# Patient Record
Sex: FEMALE | Race: WHITE | Employment: FULL TIME | URBAN - METROPOLITAN AREA
[De-identification: names, ages, dates, MRNs, and addresses within clinical notes are randomized per-mention and may not be internally consistent; named-entity substitution may affect disease eponyms.]

---

## 2019-10-17 LAB
BILIRUBIN, URINE: NORMAL
BLOOD, URINE: NEGATIVE
BUN BLDV-MCNC: 13 MG/DL
CALCIUM SERPL-MCNC: NORMAL MG/DL
CHLORIDE BLD-SCNC: NORMAL MMOL/L
CHOLESTEROL, TOTAL: 160 MG/DL
CHOLESTEROL/HDL RATIO: 2.02
CLARITY: NORMAL
CO2: NORMAL
COLOR: NORMAL
CREAT SERPL-MCNC: 0.72 MG/DL
GFR CALCULATED: NORMAL
GLUCOSE BLD-MCNC: 60 MG/DL
GLUCOSE URINE: NEGATIVE
HDLC SERPL-MCNC: 79 MG/DL (ref 35–70)
KETONES, URINE: NORMAL
LDL CHOLESTEROL CALCULATED: 68 MG/DL (ref 0–160)
LEUKOCYTE ESTERASE, URINE: NEGATIVE
NITRITE, URINE: NORMAL
NONHDLC SERPL-MCNC: ABNORMAL MG/DL
PH UA: NORMAL
POTASSIUM SERPL-SCNC: NORMAL MMOL/L
PROTEIN UA: NEGATIVE
SODIUM BLD-SCNC: NORMAL MMOL/L
SPECIFIC GRAVITY, URINE: NORMAL
TRIGL SERPL-MCNC: 63 MG/DL
UROBILINOGEN, URINE: NORMAL
VLDLC SERPL CALC-MCNC: ABNORMAL MG/DL

## 2020-07-22 ENCOUNTER — HOSPITAL ENCOUNTER (OUTPATIENT)
Age: 60
Discharge: HOME | End: 2020-07-24
Payer: COMMERCIAL

## 2020-07-22 ENCOUNTER — OFFICE VISIT (OUTPATIENT)
Dept: FAMILY MEDICINE CLINIC | Age: 60
End: 2020-07-22
Payer: COMMERCIAL

## 2020-07-22 VITALS
HEART RATE: 75 BPM | BODY MASS INDEX: 26.4 KG/M2 | RESPIRATION RATE: 16 BRPM | HEIGHT: 63 IN | WEIGHT: 149 LBS | SYSTOLIC BLOOD PRESSURE: 118 MMHG | OXYGEN SATURATION: 99 % | TEMPERATURE: 97.3 F | DIASTOLIC BLOOD PRESSURE: 72 MMHG

## 2020-07-22 LAB
BILIRUBIN, POC: NORMAL
BLOOD URINE, POC: NORMAL
CLARITY, POC: CLEAR
COLOR, POC: YELLOW
GLUCOSE URINE, POC: NORMAL
KETONES, POC: NORMAL
LEUKOCYTE EST, POC: NORMAL
NITRITE, POC: NORMAL
PH, POC: 6.5
PROTEIN, POC: NORMAL
SPECIFIC GRAVITY, POC: 1.01
UROBILINOGEN, POC: 0.2

## 2020-07-22 PROCEDURE — 87088 URINE BACTERIA CULTURE: CPT

## 2020-07-22 PROCEDURE — 99214 OFFICE O/P EST MOD 30 MIN: CPT | Performed by: PHYSICIAN ASSISTANT

## 2020-07-22 PROCEDURE — 81002 URINALYSIS NONAUTO W/O SCOPE: CPT | Performed by: PHYSICIAN ASSISTANT

## 2020-07-22 PROCEDURE — 87186 SC STD MICRODIL/AGAR DIL: CPT

## 2020-07-22 RX ORDER — CEFDINIR 300 MG/1
300 CAPSULE ORAL 2 TIMES DAILY
Qty: 14 CAPSULE | Refills: 0 | Status: SHIPPED | OUTPATIENT
Start: 2020-07-22 | End: 2020-07-29

## 2020-07-22 ASSESSMENT — ENCOUNTER SYMPTOMS
NAUSEA: 0
SORE THROAT: 0
ABDOMINAL PAIN: 0
SHORTNESS OF BREATH: 0
VOMITING: 0
BACK PAIN: 0
PHOTOPHOBIA: 0
COUGH: 0
DIARRHEA: 0

## 2020-07-22 NOTE — PROGRESS NOTES
20  Michael Woodward : 1960 Sex: female  Age 63 y.o. Subjective:  Chief Complaint   Patient presents with    Dysuria     pt states dysuria for past six days          67-year-old female with negative past medical history presents to the walk-in clinic for evaluation of 5 day history of burning with urination, frequency and urgency. She does state that overnight she did have a small amount of blood in her urine as well. She states that she has been pushing fluids. She states this is her third UTI in the last 6 months. She does not currently have a primary care physician. She states her symptoms feel similar to the last time that she has had a UTI. She denies any abdominal pain or back pain. No fever or chills. No nausea or vomiting. She states normal appetite. Normal bowel movements. Review of Systems   Constitutional: Negative for chills and fever. HENT: Negative for congestion, ear pain and sore throat. Eyes: Negative for photophobia and visual disturbance. Respiratory: Negative for cough and shortness of breath. Cardiovascular: Negative for chest pain. Gastrointestinal: Negative for abdominal pain, diarrhea, nausea and vomiting. Genitourinary: Positive for dysuria, frequency and urgency. Negative for difficulty urinating. Musculoskeletal: Negative for back pain, neck pain and neck stiffness. Skin: Negative for rash. Neurological: Negative for dizziness, syncope, weakness, light-headedness and headaches. Hematological: Negative for adenopathy. Does not bruise/bleed easily. Psychiatric/Behavioral: Negative for agitation and confusion. All other systems reviewed and are negative. PMH:   History reviewed. No pertinent past medical history. History reviewed. No pertinent surgical history. History reviewed. No pertinent family history.     Medications:     Current Outpatient Medications:     Multiple Vitamin (MULTI-VITAMIN PO), Take by mouth, Disp: , Rfl:     Allergies:   No Known Allergies    Social History:     Social History     Tobacco Use    Smoking status: Never Smoker    Smokeless tobacco: Never Used   Substance Use Topics    Alcohol use: Not on file    Drug use: Not on file       Patient lives at home. Physical Exam:     Vitals:    07/22/20 0830   BP: 118/72   Pulse: 75   Resp: 16   Temp: 97.3 °F (36.3 °C)   SpO2: 99%   Weight: 149 lb (67.6 kg)   Height: 5' 3\" (1.6 m)       Exam:  Physical Exam  Vitals signs and nursing note reviewed. Constitutional:       General: She is not in acute distress. Appearance: She is well-developed. HENT:      Head: Normocephalic and atraumatic. Eyes:      Conjunctiva/sclera: Conjunctivae normal.      Pupils: Pupils are equal, round, and reactive to light. Neck:      Musculoskeletal: Normal range of motion. Cardiovascular:      Rate and Rhythm: Normal rate and regular rhythm. Pulmonary:      Effort: Pulmonary effort is normal. No respiratory distress. Breath sounds: Normal breath sounds. Abdominal:      General: Bowel sounds are normal.      Palpations: Abdomen is soft. Tenderness: There is no abdominal tenderness. There is no right CVA tenderness or left CVA tenderness. Musculoskeletal: Normal range of motion. Skin:     General: Skin is warm and dry. Neurological:      Mental Status: She is alert and oriented to person, place, and time. Psychiatric:         Behavior: Behavior normal.         Thought Content: Thought content normal.         Judgment: Judgment normal.           Testing:           Medical Decision Making:     Patient upon arrival did not appear toxic or lethargic. Vital signs were reviewed. Past medical history reviewed. Allergies reviewed. Medications reviewed. Patient is presenting with the above complaint of urinary complaints. Urine dip reveals blood and leukocytes. Urine culture is pending.  Patient was educated on differential diagnosis. Patient will be empirically treated with Omnicef. Patient will be contacted with the results of the urine culture. She is to drink plenty of fluids. Patient was given the new patient information phone number and plans on establishing. We also discussed a referral to urology since this is her third urinary tract infection in the last 6 months. Patient would like to hold off until she is able to establish with a primary care physician. Patient was educated on signs and symptoms that would warrant emergent evaluation in the emergency department. Patient voices understanding and agrees with the plan. Clinical Impression:   Carlie Menon was seen today for dysuria. Diagnoses and all orders for this visit:    Dysuria  -     POCT Urinalysis no Micro  -     Culture, Urine; Future    Acute cystitis with hematuria    Other orders  -     cefdinir (OMNICEF) 300 MG capsule; Take 1 capsule by mouth 2 times daily for 7 days        The patient is to call for any concerns or return if any of the signs or symptoms worsen. The patient is to follow-up with PCP in the next 2-3 days for repeat evaluation repeat assessment or go directly to the emergency department. SIGNATURE: Nayana De PA-C

## 2020-07-24 LAB
ORGANISM: ABNORMAL
URINE CULTURE, ROUTINE: ABNORMAL

## 2020-09-02 ENCOUNTER — OFFICE VISIT (OUTPATIENT)
Dept: PRIMARY CARE CLINIC | Age: 60
End: 2020-09-02
Payer: COMMERCIAL

## 2020-09-02 VITALS
SYSTOLIC BLOOD PRESSURE: 122 MMHG | WEIGHT: 148 LBS | BODY MASS INDEX: 22.43 KG/M2 | TEMPERATURE: 97.3 F | DIASTOLIC BLOOD PRESSURE: 80 MMHG | HEART RATE: 62 BPM | OXYGEN SATURATION: 97 % | HEIGHT: 68 IN | RESPIRATION RATE: 16 BRPM

## 2020-09-02 PROBLEM — H02.846: Status: ACTIVE | Noted: 2020-09-02

## 2020-09-02 PROBLEM — N95.2 VAGINAL ATROPHY: Status: ACTIVE | Noted: 2020-09-02

## 2020-09-02 PROBLEM — N39.0 RECURRENT UTI: Status: ACTIVE | Noted: 2020-09-02

## 2020-09-02 PROBLEM — Z76.89 ESTABLISHING CARE WITH NEW DOCTOR, ENCOUNTER FOR: Status: ACTIVE | Noted: 2020-09-02

## 2020-09-02 PROBLEM — H02.843 SWOLLEN EYELID, RIGHT: Status: ACTIVE | Noted: 2020-09-02

## 2020-09-02 PROCEDURE — 99214 OFFICE O/P EST MOD 30 MIN: CPT | Performed by: FAMILY MEDICINE

## 2020-09-02 PROCEDURE — 90471 IMMUNIZATION ADMIN: CPT | Performed by: FAMILY MEDICINE

## 2020-09-02 PROCEDURE — 90715 TDAP VACCINE 7 YRS/> IM: CPT | Performed by: FAMILY MEDICINE

## 2020-09-02 RX ORDER — CHLORHEXIDINE GLUCONATE 4 %
LIQUID (ML) TOPICAL
COMMUNITY

## 2020-09-02 RX ORDER — ESTRADIOL 4 UG/1
INSERT VAGINAL
COMMUNITY
End: 2021-09-08 | Stop reason: SINTOL

## 2020-09-02 SDOH — HEALTH STABILITY: MENTAL HEALTH: HOW OFTEN DO YOU HAVE A DRINK CONTAINING ALCOHOL?: NEVER

## 2020-09-02 ASSESSMENT — ENCOUNTER SYMPTOMS
EYE REDNESS: 0
ABDOMINAL PAIN: 0
VOMITING: 0
SORE THROAT: 0
SHORTNESS OF BREATH: 0
NAUSEA: 0
RHINORRHEA: 0
PHOTOPHOBIA: 0
COUGH: 0
BACK PAIN: 0
DIARRHEA: 0
BLOOD IN STOOL: 0
WHEEZING: 0
CONSTIPATION: 0

## 2020-09-02 ASSESSMENT — PATIENT HEALTH QUESTIONNAIRE - PHQ9
2. FEELING DOWN, DEPRESSED OR HOPELESS: 0
1. LITTLE INTEREST OR PLEASURE IN DOING THINGS: 0
SUM OF ALL RESPONSES TO PHQ9 QUESTIONS 1 & 2: 0
SUM OF ALL RESPONSES TO PHQ QUESTIONS 1-9: 0
SUM OF ALL RESPONSES TO PHQ QUESTIONS 1-9: 0

## 2020-09-02 NOTE — PROGRESS NOTES
2020    Chief Complaint   Patient presents with   Josephine Velasquez        HPI  Maraina Dao (:  1960) is a 63 y.o. female, here for evaluation of the following medical concerns:    Patient is sensate to establish care consult. Patient recently moved to the area from [place]. Patient also has a additional house in [place] where she also has an [address]. Patient reports that she has a past medical history of vaginal atrophy and recurrent UTIs. Besides this patient reports that she is not having any other medical issues. Patient does not take any specific medications besides estradiol for her vaginal atrophy. This is prescribed by her [address]. Patient has not had lab work in approximately one year. Patient reports that she is up-to-date on all of her gynecological care. Patient is due for an updated tetanus booster, shingles immunization, and flu immunization. Patient reports she did have a colonoscopy approximately 7-8 years ago. Recurrent UTIs: Reports of this is becoming issue over the last few months. Patient has had approximate 78 UTIs. She has had several rounds of antibiotics. Patient's most recent culture did not show any signs of resistance. Patient was placed on estradiol by her [address] physician to hopefully decrease her frequency of UTIs. Patient is a  of a healthcare company. Review of Systems   Constitutional: Negative for chills and fever. HENT: Negative for congestion, hearing loss, postnasal drip, rhinorrhea, sneezing, sore throat and tinnitus. Eyes: Negative for photophobia and redness. Respiratory: Negative for cough, shortness of breath and wheezing. Cardiovascular: Negative for chest pain, palpitations and leg swelling. Gastrointestinal: Negative for abdominal pain, blood in stool, constipation, diarrhea, nausea and vomiting. Endocrine: Negative for polydipsia and polyuria.    Genitourinary: Negative for difficulty urinating, dysuria, hematuria, vaginal bleeding and vaginal discharge. Musculoskeletal: Negative for arthralgias and back pain. Skin: Negative for rash. Neurological: Negative for dizziness, weakness, light-headedness, numbness and headaches. Psychiatric/Behavioral: The patient is not nervous/anxious. Prior to Visit Medications    Medication Sig Taking? Authorizing Provider   Estradiol (IMVEXXY MAINTENANCE PACK) 4 MCG INST Place vaginally Twice a Week Yes Historical Provider, MD   Calcium Carb-Cholecalciferol (CALCIUM+D3) 600-800 MG-UNIT TABS Take by mouth Yes Historical Provider, MD   Multiple Vitamin (MULTI-VITAMIN PO) Take by mouth  Historical Provider, MD        No Known Allergies    History reviewed. No pertinent past medical history. Menstrual History:  OB History        2    Para   2    Term                AB        Living           SAB        TAB        Ectopic        Molar        Multiple        Live Births                     History reviewed. No pertinent surgical history. History reviewed. No pertinent family history.     Immunization History   Administered Date(s) Administered    Tdap (Boostrix, Adacel) 2020       Health Maintenance   Topic Date Due    Cervical cancer screen  1980    Lipid screen  1999    Breast cancer screen  2009    Shingles Vaccine (1 of 2) 2009    Colon cancer screen colonoscopy  2009    Flu vaccine (1) 2020    DTaP/Tdap/Td vaccine (2 - Td) 2030    Hepatitis A vaccine  Aged Out    Hepatitis B vaccine  Aged Out    Hib vaccine  Aged Out    Meningococcal (ACWY) vaccine  Aged Out    Pneumococcal 0-64 years Vaccine  Aged Out    Hepatitis C screen  Discontinued    HIV screen  Discontinued       Social History     Socioeconomic History    Marital status:      Spouse name: Not on file    Number of children: Not on file    Years of education: Not on file    Highest education level: Not on normal.      Pupils: Pupils are equal, round, and reactive to light. Neck:      Musculoskeletal: Neck supple. Thyroid: No thyromegaly. Trachea: Trachea normal.   Cardiovascular:      Rate and Rhythm: Normal rate and regular rhythm. Pulses:           Radial pulses are 2+ on the right side and 2+ on the left side. Posterior tibial pulses are 2+ on the right side and 2+ on the left side. Heart sounds: Normal heart sounds. No murmur. Pulmonary:      Effort: Pulmonary effort is normal. No respiratory distress. Breath sounds: Normal breath sounds. No decreased breath sounds, wheezing or rales. Abdominal:      General: Bowel sounds are normal. There is no distension. Palpations: Abdomen is soft. Tenderness: There is no abdominal tenderness. There is no rebound. Musculoskeletal: Normal range of motion. Right lower leg: No edema. Left lower leg: No edema. Skin:     General: Skin is warm and dry. Findings: No rash. Neurological:      Mental Status: She is alert and oriented to person, place, and time. Cranial Nerves: No cranial nerve deficit. Sensory: No sensory deficit. Deep Tendon Reflexes:      Reflex Scores:       Patellar reflexes are 2+ on the right side and 2+ on the left side. Psychiatric:         Behavior: Behavior normal.         Patient Active Problem List    Diagnosis Date Noted    Establishing care with new doctor, encounter for 09/02/2020    Swollen eyelid, left 09/02/2020    Recurrent UTI 09/02/2020    Vaginal atrophy 09/02/2020    Swollen eyelid, right 09/02/2020         ASSESSMENT/PLAN:  Raegan Mcclain was seen today for established new doctor. Diagnoses and all orders for this visit:    Recurrent UTI  -     CBC Auto Differential; Future  -     Comprehensive Metabolic Panel; Future  Patient is currently on estradiol for such. Consider adding d-mannose in the near future if she continues to have recurrent UTIs.  Possibly advised patient to avoid chicken. Establishing care with new doctor, encounter for  -     CBC Auto Differential; Future  -     Comprehensive Metabolic Panel; Future  -     Lipid Panel; Future  -     TSH without Reflex; Future  Will need to obtain the patient's previous medical records. Patient does not have any significant risk factors and denies any history of tobacco, alcohol, illicit drug use. Patient consumes a healthy and well rounded diet and exercises regularly. Vaginal atrophy  -     CBC Auto Differential; Future  -     Comprehensive Metabolic Panel; Future  Following with ObGyn for this issue and is on estradiol twice weekly, vaginal cream.    Screening cholesterol level  -     Lipid Panel; Future    Need for tetanus booster  -     Tdap (age 6y and older) IM ([address])    Need for diphtheria-tetanus-pertussis (Tdap) vaccine  -     Tdap (age 6y and older) IM (BOOSTRIX)    Swelling of eyelids: Patient reports that this started several months ago after she had a severe allergic reaction to an unknown substance. She did follow with dermatology. She was advised that she may need to see an ophthalmologist approximately 8 months. Patient reports 2-3 times a month swelling of her eyelids which resulted in droopiness and blurry vision. If the patient holds her eyelids up her vision improves. In regards this issue the patient should keep a food diary for review to determine if there are any specific triggers for her reported swelling of her eyelids. Health Maintenance reviewed - patient is to have all her previous medical records forwarded to myself. Patient is also to have her records from her [facility] forwarded to myself. Patient will obtain a flu shot and shingles immunization at her local pharmacy. Return in about 4 weeks (around 9/30/2020) for Follow-up Appointment From Today's Visit, To Discuss Labs Results.       Educational materials and/or home exercises printed for patient's review and were included in patient instructions on his/her After Visit Summary and given to patient at the end of visit.       Counseled regarding above diagnosis, including possible risks and complications,  especially if left uncontrolled.     Counseled regarding the possible side effects, risks, benefits and alternatives to treatment; patient and/or guardianverbalizes understanding, agrees, feels comfortable with and wishes to proceed with above treatment plan.     Advised patient to call with any new medication issues, and read all Rx info from pharmacy to assure aware of all possible risks and side effects of medication before taking.     Reviewed age and gender appropriate health screening exams and vaccinations. Advised patient regarding importance of keeping up withrecommended health maintenance and to schedule as soon as possible if overdue, as this is important in assessing for undiagnosed pathology, especially cancer, as well as protecting against potentially harmful/lifethreatening disease.       Patient and/or guardian verbalizes understanding and agrees with abovecounseling, assessment and plan.     All questions answered. An  electronic signature was used to authenticatethis note.     --Dimple Fortune MD on 9/2/20 at 8:14 AM EDT

## 2020-09-23 ENCOUNTER — HOSPITAL ENCOUNTER (OUTPATIENT)
Age: 60
Discharge: HOME | End: 2020-09-25
Payer: COMMERCIAL

## 2020-09-23 LAB
ALBUMIN SERPL-MCNC: 4.2 G/DL (ref 3.5–5.2)
ALP BLD-CCNC: 70 U/L (ref 35–104)
ALT SERPL-CCNC: 12 U/L (ref 0–32)
ANION GAP SERPL CALCULATED.3IONS-SCNC: 13 MMOL/L (ref 7–16)
AST SERPL-CCNC: 19 U/L (ref 0–31)
BASOPHILS ABSOLUTE: 0.07 E9/L (ref 0–0.2)
BASOPHILS RELATIVE PERCENT: 1.4 % (ref 0–2)
BILIRUB SERPL-MCNC: 0.2 MG/DL (ref 0–1.2)
BUN BLDV-MCNC: 18 MG/DL (ref 8–23)
CALCIUM SERPL-MCNC: 10 MG/DL (ref 8.6–10.2)
CHLORIDE BLD-SCNC: 103 MMOL/L (ref 98–107)
CHOLESTEROL, TOTAL: 200 MG/DL (ref 0–199)
CO2: 26 MMOL/L (ref 22–29)
CREAT SERPL-MCNC: 0.8 MG/DL (ref 0.5–1)
EOSINOPHILS ABSOLUTE: 0.33 E9/L (ref 0.05–0.5)
EOSINOPHILS RELATIVE PERCENT: 6.4 % (ref 0–6)
GFR AFRICAN AMERICAN: >60
GFR NON-AFRICAN AMERICAN: >60 ML/MIN/1.73
GLUCOSE BLD-MCNC: 83 MG/DL (ref 74–99)
HCT VFR BLD CALC: 42.6 % (ref 34–48)
HDLC SERPL-MCNC: 75 MG/DL
HEMOGLOBIN: 13.6 G/DL (ref 11.5–15.5)
IMMATURE GRANULOCYTES #: 0.01 E9/L
IMMATURE GRANULOCYTES %: 0.2 % (ref 0–5)
LDL CHOLESTEROL CALCULATED: 115 MG/DL (ref 0–99)
LYMPHOCYTES ABSOLUTE: 2.34 E9/L (ref 1.5–4)
LYMPHOCYTES RELATIVE PERCENT: 45.3 % (ref 20–42)
MCH RBC QN AUTO: 30.6 PG (ref 26–35)
MCHC RBC AUTO-ENTMCNC: 31.9 % (ref 32–34.5)
MCV RBC AUTO: 95.7 FL (ref 80–99.9)
MONOCYTES ABSOLUTE: 0.35 E9/L (ref 0.1–0.95)
MONOCYTES RELATIVE PERCENT: 6.8 % (ref 2–12)
NEUTROPHILS ABSOLUTE: 2.07 E9/L (ref 1.8–7.3)
NEUTROPHILS RELATIVE PERCENT: 39.9 % (ref 43–80)
PDW BLD-RTO: 12.9 FL (ref 11.5–15)
PLATELET # BLD: 302 E9/L (ref 130–450)
PMV BLD AUTO: 11 FL (ref 7–12)
POTASSIUM SERPL-SCNC: 4.8 MMOL/L (ref 3.5–5)
RBC # BLD: 4.45 E12/L (ref 3.5–5.5)
SODIUM BLD-SCNC: 142 MMOL/L (ref 132–146)
TOTAL PROTEIN: 6.8 G/DL (ref 6.4–8.3)
TRIGL SERPL-MCNC: 52 MG/DL (ref 0–149)
TSH SERPL DL<=0.05 MIU/L-ACNC: 2 UIU/ML (ref 0.27–4.2)
VLDLC SERPL CALC-MCNC: 10 MG/DL
WBC # BLD: 5.2 E9/L (ref 4.5–11.5)

## 2020-09-23 PROCEDURE — 84443 ASSAY THYROID STIM HORMONE: CPT

## 2020-09-23 PROCEDURE — 80053 COMPREHEN METABOLIC PANEL: CPT

## 2020-09-23 PROCEDURE — 36415 COLL VENOUS BLD VENIPUNCTURE: CPT

## 2020-09-23 PROCEDURE — 85025 COMPLETE CBC W/AUTO DIFF WBC: CPT

## 2020-09-23 PROCEDURE — 80061 LIPID PANEL: CPT

## 2020-10-14 ENCOUNTER — OFFICE VISIT (OUTPATIENT)
Dept: PRIMARY CARE CLINIC | Age: 60
End: 2020-10-14
Payer: COMMERCIAL

## 2020-10-14 VITALS
HEIGHT: 68 IN | SYSTOLIC BLOOD PRESSURE: 122 MMHG | HEART RATE: 74 BPM | TEMPERATURE: 97.9 F | OXYGEN SATURATION: 97 % | WEIGHT: 145 LBS | RESPIRATION RATE: 16 BRPM | DIASTOLIC BLOOD PRESSURE: 82 MMHG | BODY MASS INDEX: 21.98 KG/M2

## 2020-10-14 PROBLEM — Z76.89 ESTABLISHING CARE WITH NEW DOCTOR, ENCOUNTER FOR: Status: RESOLVED | Noted: 2020-09-02 | Resolved: 2020-10-14

## 2020-10-14 PROCEDURE — 99214 OFFICE O/P EST MOD 30 MIN: CPT | Performed by: FAMILY MEDICINE

## 2020-10-14 RX ORDER — MULTIVIT-MIN/IRON/FOLIC ACID/K 18-600-40
CAPSULE ORAL
COMMUNITY

## 2020-10-14 ASSESSMENT — ENCOUNTER SYMPTOMS
VOMITING: 0
RHINORRHEA: 0
WHEEZING: 0
ABDOMINAL PAIN: 0
SHORTNESS OF BREATH: 0
SORE THROAT: 0
DIARRHEA: 0
CONSTIPATION: 0
NAUSEA: 0

## 2020-10-14 NOTE — PROGRESS NOTES
10/14/2020     Chief Complaint   Patient presents with    Results       HPI  Judythe Favre (:  1960) is a 63 y.o. female, here for evaluation of the following medical concerns:    Labs: CBC within normal limits, CMP within normal limits, total cholesterol 200, total size 52, HDL 75, . TSH 2, urinalysis essentially negative, urine culture positive for E. coli greater than 100,000 CFU. History of allergic reaction: Patient and her last office appointment was advised to keep a food diary regards this issue. Patient reports that while she did not keep a food diary she was keeping a mental log of the food she was consuming. The only food that seem to trigger the swelling of her eyelids and droopiness was a pizza. Patient reports that she usually does not consume refined grains or gluten-containing foods. This may have been her trigger. Patient has not followed up with ophthalmology in regards to this issue. Dermatology did not find any specific etiology for her reported symptoms. Patient did not try anything in regards to medications for this issue. DEXA scan: Patient reports osteopenia in her hip. Patient did start 2000 international units of vitamin D daily. Vaginal atrophy: Patient is following with ObGyn for this issue. Using vaginal cream twice weekly. Stable. History of recurrent UTIs: Regards this issue patient has been seeing ObGyn and was placed on estradiol topically for such. No recurrent UTIs since her last office appointment. Health maintenance: Patient does follow with OB/GYN. Up-to-date on all of her other issues. Patient did not get her flu shot. Review of Systems   Constitutional: Negative for chills and fever. HENT: Negative for congestion, rhinorrhea and sore throat. Respiratory: Negative for shortness of breath and wheezing. Cardiovascular: Negative for chest pain and leg swelling.    Gastrointestinal: Negative for abdominal pain, constipation,

## 2020-10-22 ENCOUNTER — HOSPITAL ENCOUNTER (OUTPATIENT)
Age: 60
Discharge: HOME | End: 2020-10-24
Payer: COMMERCIAL

## 2020-10-22 ENCOUNTER — TELEPHONE (OUTPATIENT)
Dept: PRIMARY CARE CLINIC | Age: 60
End: 2020-10-22

## 2020-10-22 LAB — VITAMIN D 25-HYDROXY: 36 NG/ML (ref 30–100)

## 2020-10-22 PROCEDURE — 82306 VITAMIN D 25 HYDROXY: CPT

## 2020-10-22 PROCEDURE — 36415 COLL VENOUS BLD VENIPUNCTURE: CPT

## 2020-12-28 ENCOUNTER — OFFICE VISIT (OUTPATIENT)
Dept: PRIMARY CARE CLINIC | Age: 60
End: 2020-12-28
Payer: COMMERCIAL

## 2020-12-28 VITALS
SYSTOLIC BLOOD PRESSURE: 114 MMHG | HEIGHT: 68 IN | WEIGHT: 131 LBS | RESPIRATION RATE: 18 BRPM | HEART RATE: 76 BPM | BODY MASS INDEX: 19.85 KG/M2 | OXYGEN SATURATION: 97 % | DIASTOLIC BLOOD PRESSURE: 80 MMHG | TEMPERATURE: 97.1 F

## 2020-12-28 DIAGNOSIS — H02.846 SWOLLEN EYELID, LEFT: ICD-10-CM

## 2020-12-28 DIAGNOSIS — H02.843 SWOLLEN EYELID, RIGHT: ICD-10-CM

## 2020-12-28 DIAGNOSIS — R53.83 FATIGUE, UNSPECIFIED TYPE: ICD-10-CM

## 2020-12-28 DIAGNOSIS — M79.89 SWELLING OF LOWER EXTREMITY: ICD-10-CM

## 2020-12-28 LAB
ALBUMIN SERPL-MCNC: 5.1 G/DL (ref 3.5–5.2)
ALP BLD-CCNC: 55 U/L (ref 35–104)
ALT SERPL-CCNC: 13 U/L (ref 0–32)
ANION GAP SERPL CALCULATED.3IONS-SCNC: 18 MMOL/L (ref 7–16)
AST SERPL-CCNC: 17 U/L (ref 0–31)
BASOPHILS ABSOLUTE: 0.05 E9/L (ref 0–0.2)
BASOPHILS RELATIVE PERCENT: 0.8 % (ref 0–2)
BILIRUB SERPL-MCNC: 0.3 MG/DL (ref 0–1.2)
BUN BLDV-MCNC: 18 MG/DL (ref 8–23)
C-REACTIVE PROTEIN: <0.1 MG/DL (ref 0–0.4)
CALCIUM SERPL-MCNC: 10.5 MG/DL (ref 8.6–10.2)
CHLORIDE BLD-SCNC: 104 MMOL/L (ref 98–107)
CO2: 22 MMOL/L (ref 22–29)
CREAT SERPL-MCNC: 0.7 MG/DL (ref 0.5–1)
EOSINOPHILS ABSOLUTE: 0.24 E9/L (ref 0.05–0.5)
EOSINOPHILS RELATIVE PERCENT: 3.9 % (ref 0–6)
GFR AFRICAN AMERICAN: >60
GFR NON-AFRICAN AMERICAN: >60 ML/MIN/1.73
GLUCOSE BLD-MCNC: 94 MG/DL (ref 74–99)
HCT VFR BLD CALC: 44.7 % (ref 34–48)
HEMOGLOBIN: 14.5 G/DL (ref 11.5–15.5)
IMMATURE GRANULOCYTES #: 0.01 E9/L
IMMATURE GRANULOCYTES %: 0.2 % (ref 0–5)
LYMPHOCYTES ABSOLUTE: 2.57 E9/L (ref 1.5–4)
LYMPHOCYTES RELATIVE PERCENT: 41.9 % (ref 20–42)
MCH RBC QN AUTO: 30.6 PG (ref 26–35)
MCHC RBC AUTO-ENTMCNC: 32.4 % (ref 32–34.5)
MCV RBC AUTO: 94.3 FL (ref 80–99.9)
MONOCYTES ABSOLUTE: 0.38 E9/L (ref 0.1–0.95)
MONOCYTES RELATIVE PERCENT: 6.2 % (ref 2–12)
NEUTROPHILS ABSOLUTE: 2.89 E9/L (ref 1.8–7.3)
NEUTROPHILS RELATIVE PERCENT: 47 % (ref 43–80)
PDW BLD-RTO: 14.1 FL (ref 11.5–15)
PLATELET # BLD: 306 E9/L (ref 130–450)
PMV BLD AUTO: 11.3 FL (ref 7–12)
POTASSIUM SERPL-SCNC: 4.2 MMOL/L (ref 3.5–5)
PRO-BNP: 71 PG/ML (ref 0–125)
RBC # BLD: 4.74 E12/L (ref 3.5–5.5)
RHEUMATOID FACTOR: <10 IU/ML (ref 0–13)
SEDIMENTATION RATE, ERYTHROCYTE: 6 MM/HR (ref 0–20)
SODIUM BLD-SCNC: 144 MMOL/L (ref 132–146)
T4 FREE: 0.98 NG/DL (ref 0.93–1.7)
TOTAL PROTEIN: 7.9 G/DL (ref 6.4–8.3)
TSH SERPL DL<=0.05 MIU/L-ACNC: 2.65 UIU/ML (ref 0.27–4.2)
WBC # BLD: 6.1 E9/L (ref 4.5–11.5)

## 2020-12-28 PROCEDURE — 99213 OFFICE O/P EST LOW 20 MIN: CPT | Performed by: FAMILY MEDICINE

## 2020-12-28 RX ORDER — METHYLPREDNISOLONE 4 MG/1
TABLET ORAL
Qty: 1 KIT | Refills: 0 | Status: SHIPPED
Start: 2020-12-28 | End: 2021-01-26 | Stop reason: ALTCHOICE

## 2020-12-28 ASSESSMENT — ENCOUNTER SYMPTOMS
COUGH: 0
VOMITING: 0
SHORTNESS OF BREATH: 0
DIARRHEA: 0
RHINORRHEA: 0
ABDOMINAL PAIN: 0
NAUSEA: 0
WHEEZING: 0
SORE THROAT: 0
CONSTIPATION: 0

## 2020-12-28 NOTE — PROGRESS NOTES
2020     Chief Complaint   Patient presents with    Facial Swelling     since     Fatigue     last 2 weeks. ESTEFANIA Maravilla (:  1960) is a 63 y.o. female, here for evaluation of the following medical concerns:     Patient reports that she has had ongoing fatigue and tiredness with up to sleeping 12 hours/day over the last several weeks. This has not changed significantly. Patient also developed lower extremity swelling and facial swelling around her eyelids approximately 4 to 6 days ago. Patient reports that her lower extremity swelling resolved within 24 to 48 hours. However she has had persistent facial swelling around her eyelids. This was addressed at the patient's previous office appointment. She did see dermatology without any specific diagnosis or resolution. Patient denies any muscle aches or pains. Patient denies any weakness. Patient denies any specific rashes. Patient denies eye pain or sensitivity. Review of Systems   Constitutional: Negative for chills and fever. HENT: Negative for congestion, rhinorrhea and sore throat. Respiratory: Negative for cough, shortness of breath and wheezing. Cardiovascular: Positive for leg swelling. Negative for chest pain. Gastrointestinal: Negative for abdominal pain, constipation, diarrhea, nausea and vomiting. Skin: Negative for rash. Neurological: Negative for light-headedness and headaches. No past medical history on file. Prior to Visit Medications    Medication Sig Taking? Authorizing Provider   methylPREDNISolone (MEDROL, ALLEN,) 4 MG tablet Take by mouth.  Yes German Bradley MD   Cholecalciferol (VITAMIN D) 50 MCG ( UT) CAPS capsule Take by mouth Yes Historical Provider, MD   Estradiol (IMVEXXY MAINTENANCE PACK) 4 MCG INST Place vaginally Twice a Week Yes Historical Provider, MD   Calcium Carb-Cholecalciferol (CALCIUM+D3) 600-800 MG-UNIT TABS Take by mouth Yes Historical Provider, MD NATRIURETIC PEPTIDE; Future  -     methylPREDNISolone (MEDROL, ALLEN,) 4 MG tablet; Take by mouth. Swollen eyelid, right  -     CBC Auto Differential; Future  -     Comprehensive Metabolic Panel; Future  -     TSH without Reflex; Future  -     T4, Free; Future  -     C-Reactive Protein; Future  -     Sedimentation Rate; Future  -     KARI; Future  -     Rheumatoid Factor; Future  -     External Referral To Ophthalmology  -     BRAIN NATRIURETIC PEPTIDE; Future  -     methylPREDNISolone (MEDROL, ALLEN,) 4 MG tablet; Take by mouth. Fatigue, unspecified type  -     CBC Auto Differential; Future  -     Comprehensive Metabolic Panel; Future  -     TSH without Reflex; Future  -     T4, Free; Future  -     C-Reactive Protein; Future  -     Sedimentation Rate; Future  -     KARI; Future  -     Rheumatoid Factor; Future    Swelling of lower extremity  -     CBC Auto Differential; Future  -     Comprehensive Metabolic Panel; Future  -     TSH without Reflex; Future  -     T4, Free; Future  -     C-Reactive Protein; Future  -     Sedimentation Rate; Future  -     KARI; Future  -     Rheumatoid Factor; Future  -     BRAIN NATRIURETIC PEPTIDE; Future    Complete labs today. Rule out autoimmune issue. Patient to follow-up with ophthalmology per referral.  Trial of steroids. Side effects from steroids were reviewed. Will contact patient with lab results and inquire into how she is doing. Patient is to proceed to the emergency department with any shortness of breath or chest pain. Unlikely cardiac or pulmonary etiology due to lack of other systemic symptoms. Return if symptoms worsen or fail to improve. An electronicsignature was used to authenticate this note.     --Shant Hines MD on 12/28/20 at 11:34 AM EST

## 2020-12-29 LAB — ANTI-NUCLEAR ANTIBODY (ANA): NEGATIVE

## 2021-01-26 ENCOUNTER — OFFICE VISIT (OUTPATIENT)
Dept: FAMILY MEDICINE CLINIC | Age: 61
End: 2021-01-26
Payer: COMMERCIAL

## 2021-01-26 ENCOUNTER — TELEPHONE (OUTPATIENT)
Dept: PRIMARY CARE CLINIC | Age: 61
End: 2021-01-26

## 2021-01-26 VITALS
HEIGHT: 68 IN | HEART RATE: 84 BPM | OXYGEN SATURATION: 98 % | RESPIRATION RATE: 18 BRPM | DIASTOLIC BLOOD PRESSURE: 72 MMHG | TEMPERATURE: 97.7 F | WEIGHT: 136 LBS | SYSTOLIC BLOOD PRESSURE: 116 MMHG | BODY MASS INDEX: 20.61 KG/M2

## 2021-01-26 DIAGNOSIS — N39.0 URINARY TRACT INFECTION WITH HEMATURIA, SITE UNSPECIFIED: Primary | ICD-10-CM

## 2021-01-26 DIAGNOSIS — R30.0 DYSURIA: ICD-10-CM

## 2021-01-26 DIAGNOSIS — R31.9 HEMATURIA, UNSPECIFIED TYPE: ICD-10-CM

## 2021-01-26 DIAGNOSIS — R31.9 URINARY TRACT INFECTION WITH HEMATURIA, SITE UNSPECIFIED: Primary | ICD-10-CM

## 2021-01-26 DIAGNOSIS — R35.0 URINARY FREQUENCY: ICD-10-CM

## 2021-01-26 LAB
BILIRUBIN, POC: NEGATIVE
BLOOD URINE, POC: NORMAL
CLARITY, POC: CLEAR
COLOR, POC: YELLOW
GLUCOSE URINE, POC: NEGATIVE
KETONES, POC: NEGATIVE
LEUKOCYTE EST, POC: NORMAL
NITRITE, POC: NEGATIVE
PH, POC: 6.5
PROTEIN, POC: NEGATIVE
SPECIFIC GRAVITY, POC: 1.01
UROBILINOGEN, POC: 0.2

## 2021-01-26 PROCEDURE — 99214 OFFICE O/P EST MOD 30 MIN: CPT | Performed by: PHYSICIAN ASSISTANT

## 2021-01-26 PROCEDURE — 81002 URINALYSIS NONAUTO W/O SCOPE: CPT | Performed by: PHYSICIAN ASSISTANT

## 2021-01-26 RX ORDER — PHENAZOPYRIDINE HYDROCHLORIDE 200 MG/1
200 TABLET, FILM COATED ORAL 3 TIMES DAILY PRN
Qty: 9 TABLET | Refills: 0 | Status: SHIPPED | OUTPATIENT
Start: 2021-01-26 | End: 2021-01-29

## 2021-01-26 RX ORDER — NITROFURANTOIN 25; 75 MG/1; MG/1
100 CAPSULE ORAL 2 TIMES DAILY
Qty: 14 CAPSULE | Refills: 0 | Status: SHIPPED
Start: 2021-01-26 | End: 2021-03-17 | Stop reason: SDUPTHER

## 2021-01-26 NOTE — PROGRESS NOTES
21  Wanda Lawrence : 1960 Sex: female  Age 63 y.o. Subjective:  Chief Complaint   Patient presents with    Dysuria     dysuria for past several days          HPI:   Wanda Lawrence , 63 y.o. female presents to [facility] care for evaluation of dysuria and frequency. The patient has noted the symptoms ongoing for the last 3 to 4 days. Seems to be worse today. The patient's not any chest pain, shortness of breath, lightheadedness, dizziness. The patient tolerated back pain, flank pain. The patient is eating and drinking normally. Not currently on any antibiotics. Patient does have a history of recurrent urinary tract infection. She recently was started on the Imvexxy which was helping but now she has presented again. Her last UTI in epic was in 2020. The patient is not having any fevers or chills. No vaginal bleeding or vaginal discharge. ROS:   Unless otherwise stated in this report the patient's positive and negative responses for review of systems for constitutional, eyes, ENT, cardiovascular, respiratory, gastrointestinal, neurological, , musculoskeletal, and integument systems and related systems to the presenting problem are either stated in the history of present illness or were not pertinent or were negative for the symptoms and/or complaints related to the presenting medical problem. Positives and pertinent negatives as per HPI. All others reviewed and are negative. PMH:   No past medical history on file. No past surgical history on file. No family history on file.     Medications:     Current Outpatient Medications:     phenazopyridine (PYRIDIUM) 200 MG tablet, Take 1 tablet by mouth 3 times daily as needed for Pain, Disp: 9 tablet, Rfl: 0    nitrofurantoin, macrocrystal-monohydrate, (MACROBID) 100 MG capsule, Take 1 capsule by mouth 2 times daily for 7 days, Disp: 14 capsule, Rfl: 0    Cholecalciferol (VITAMIN D) 50 MCG (2000) CAPS capsule, Take by hematuria, site unspecified    Dysuria  -     POCT Urinalysis no Micro  -     Culture, Urine; Future    Urinary frequency    Hematuria, unspecified type    Other orders  -     phenazopyridine (PYRIDIUM) 200 MG tablet; Take 1 tablet by mouth 3 times daily as needed for Pain  -     nitrofurantoin, macrocrystal-monohydrate, (MACROBID) 100 MG capsule; Take 1 capsule by mouth 2 times daily for 7 days        The patient is to call for any concerns or return if any of the signs or symptoms worsen. The patient is to follow-up with PCP in the next 2-3 days for repeat evaluation repeat assessment or go directly to the emergency department.      SIGNATURE: Daniel Vallecillo III, PA-C

## 2021-01-26 NOTE — TELEPHONE ENCOUNTER
Patient calling, has uti, burning, pain, and frequency. Asking if you could call in antibiotic for her. Kiarra Every Dr. Olman Sepulveda patient you were out of office and advised express care if she did not hear back from office by this afternoon.

## 2021-01-29 LAB
ORGANISM: ABNORMAL
URINE CULTURE, ROUTINE: ABNORMAL

## 2021-03-17 ENCOUNTER — OFFICE VISIT (OUTPATIENT)
Dept: PRIMARY CARE CLINIC | Age: 61
End: 2021-03-17
Payer: COMMERCIAL

## 2021-03-17 VITALS
SYSTOLIC BLOOD PRESSURE: 106 MMHG | HEART RATE: 59 BPM | BODY MASS INDEX: 20.61 KG/M2 | OXYGEN SATURATION: 100 % | RESPIRATION RATE: 18 BRPM | WEIGHT: 136 LBS | TEMPERATURE: 97.3 F | HEIGHT: 68 IN | DIASTOLIC BLOOD PRESSURE: 78 MMHG

## 2021-03-17 DIAGNOSIS — N30.01 ACUTE CYSTITIS WITH HEMATURIA: ICD-10-CM

## 2021-03-17 DIAGNOSIS — R35.0 URINARY FREQUENCY: ICD-10-CM

## 2021-03-17 DIAGNOSIS — R35.0 URINARY FREQUENCY: Primary | ICD-10-CM

## 2021-03-17 LAB
BILIRUBIN, POC: NEGATIVE
BLOOD URINE, POC: NORMAL
CLARITY, POC: NORMAL
COLOR, POC: YELLOW
GLUCOSE URINE, POC: NEGATIVE
KETONES, POC: NEGATIVE
LEUKOCYTE EST, POC: NORMAL
NITRITE, POC: NEGATIVE
PH, POC: 6
PROTEIN, POC: NEGATIVE
SPECIFIC GRAVITY, POC: 1.01
UROBILINOGEN, POC: 0.2

## 2021-03-17 PROCEDURE — 81003 URINALYSIS AUTO W/O SCOPE: CPT | Performed by: FAMILY MEDICINE

## 2021-03-17 PROCEDURE — 99213 OFFICE O/P EST LOW 20 MIN: CPT | Performed by: FAMILY MEDICINE

## 2021-03-17 RX ORDER — NITROFURANTOIN 25; 75 MG/1; MG/1
100 CAPSULE ORAL 2 TIMES DAILY
Qty: 14 CAPSULE | Refills: 0 | Status: SHIPPED | OUTPATIENT
Start: 2021-03-17 | End: 2021-03-24

## 2021-03-17 ASSESSMENT — PATIENT HEALTH QUESTIONNAIRE - PHQ9
SUM OF ALL RESPONSES TO PHQ QUESTIONS 1-9: 0

## 2021-03-17 NOTE — PROGRESS NOTES
3/17/21  Nayana Cano : 1960 Sex: female  Age: 63 y.o. Chief Complaint   Patient presents with    Urinary Tract Infection     frequency, burning, uncomfortable started monday       HPI: The patient states that they have had dysuria and urinary frequency for the last 3 days. The patient does admit to suprapubic pressure. The patient has had urgency but denies gross hematuria. The patient denies any abdominal or flank pain. The patient denies nausea and vomiting. No fevers or chills. No prior history of kidney stones. The patient has a history of UTI's and states that this feels the same. They come to the urgent care for evaluation. ROS:  Const: Positives and pertinent negatives as per HPI. All others reviewed and are negative. Current Outpatient Medications:     nitrofurantoin, macrocrystal-monohydrate, (MACROBID) 100 MG capsule, Take 1 capsule by mouth 2 times daily for 7 days, Disp: 14 capsule, Rfl: 0    Cholecalciferol (VITAMIN D) 50 MCG (2000 UT) CAPS capsule, Take by mouth, Disp: , Rfl:     Estradiol (IMVEXXY MAINTENANCE PACK) 4 MCG INST, Place vaginally Twice a Week, Disp: , Rfl:     Calcium Carb-Cholecalciferol (CALCIUM+D3) 600-800 MG-UNIT TABS, Take by mouth, Disp: , Rfl:     Multiple Vitamin (MULTI-VITAMIN PO), Take by mouth, Disp: , Rfl:   No Known Allergies    No past medical history on file. No past surgical history on file. No family history on file.   Social History     Socioeconomic History    Marital status:      Spouse name: Not on file    Number of children: Not on file    Years of education: Not on file    Highest education level: Not on file   Occupational History    Not on file   Social Needs    Financial resource strain: Not on file    Food insecurity     Worry: Not on file     Inability: Not on file    Transportation needs     Medical: Not on file     Non-medical: Not on file   Tobacco Use    Smoking status: Never Smoker    Smokeless tobacco: Never Used   Substance and Sexual Activity    Alcohol use: Never     Frequency: Never    Drug use: Never    Sexual activity: Not on file   Lifestyle    Physical activity     Days per week: Not on file     Minutes per session: Not on file    Stress: Not on file   Relationships    Social connections     Talks on phone: Not on file     Gets together: Not on file     Attends Roman Catholic service: Not on file     Active member of club or organization: Not on file     Attends meetings of clubs or organizations: Not on file     Relationship status: Not on file    Intimate partner violence     Fear of current or ex partner: Not on file     Emotionally abused: Not on file     Physically abused: Not on file     Forced sexual activity: Not on file   Other Topics Concern    Not on file   Social History Narrative    Not on file       Vitals:    03/17/21 1002   BP: 106/78   Pulse: 59   Resp: 18   Temp: 97.3 °F (36.3 °C)   TempSrc: Temporal   SpO2: 100%   Weight: 136 lb (61.7 kg)   Height: 5' 8\" (1.727 m)       Exam:  Physical Exam  Constitutional:       Appearance: She is well-developed. HENT:      Head: Normocephalic. Eyes:      Conjunctiva/sclera: Conjunctivae normal.      Pupils: Pupils are equal, round, and reactive to light. Cardiovascular:      Rate and Rhythm: Normal rate and regular rhythm. Heart sounds: Normal heart sounds. No murmur. Pulmonary:      Effort: Pulmonary effort is normal.      Breath sounds: Normal breath sounds. No wheezing or rales. Abdominal:      General: Bowel sounds are normal.      Palpations: Abdomen is soft. Tenderness: There is no abdominal tenderness. Neurological:      Mental Status: She is alert. Comments: Cranial nerves grossly intact         Assessment and Plan:   Jeane was seen today for urinary tract infection.     Diagnoses and all orders for this visit:    Urinary frequency  -     POCT Urinalysis No Micro (Auto)  -     nitrofurantoin, macrocrystal-monohydrate, (MACROBID) 100 MG capsule; Take 1 capsule by mouth 2 times daily for 7 days  -     Urinalysis; Future    Acute cystitis with hematuria  -     nitrofurantoin, macrocrystal-monohydrate, (MACROBID) 100 MG capsule; Take 1 capsule by mouth 2 times daily for 7 days  -     Urinalysis; Future  -     Culture, Urine; Future    UA + for Leuk Est and Blood. Will treat w/ abx. Send Cx. Repeat UA at next apt. Pt advised to start d-mannose and probiotic. Avoid chicken. Return if symptoms worsen or fail to improve. The above treatment regimen was discussed with the patient at length and all questions in regards to such were answered. Common side effect of antibiotics and rare side effect of C. diff was discussed.      Jada Talavera MD

## 2021-03-18 LAB
BACTERIA: ABNORMAL /HPF
BILIRUBIN URINE: NEGATIVE
BLOOD, URINE: ABNORMAL
CLARITY: CLEAR
COLOR: YELLOW
EPITHELIAL CELLS, UA: ABNORMAL /HPF
GLUCOSE URINE: NEGATIVE MG/DL
KETONES, URINE: NEGATIVE MG/DL
LEUKOCYTE ESTERASE, URINE: ABNORMAL
NITRITE, URINE: NEGATIVE
PH UA: 6 (ref 5–9)
PROTEIN UA: NEGATIVE MG/DL
RBC UA: ABNORMAL /HPF (ref 0–2)
SPECIFIC GRAVITY UA: 1.01 (ref 1–1.03)
UROBILINOGEN, URINE: 0.2 E.U./DL
WBC UA: ABNORMAL /HPF (ref 0–5)

## 2021-03-20 LAB
ORGANISM: ABNORMAL
URINE CULTURE, ROUTINE: ABNORMAL

## 2021-04-14 ENCOUNTER — OFFICE VISIT (OUTPATIENT)
Dept: PRIMARY CARE CLINIC | Age: 61
End: 2021-04-14
Payer: COMMERCIAL

## 2021-04-14 VITALS
WEIGHT: 136 LBS | BODY MASS INDEX: 20.61 KG/M2 | TEMPERATURE: 97.3 F | HEIGHT: 68 IN | HEART RATE: 61 BPM | SYSTOLIC BLOOD PRESSURE: 110 MMHG | RESPIRATION RATE: 18 BRPM | DIASTOLIC BLOOD PRESSURE: 78 MMHG | OXYGEN SATURATION: 100 %

## 2021-04-14 DIAGNOSIS — N39.0 RECURRENT UTI: Primary | ICD-10-CM

## 2021-04-14 DIAGNOSIS — M85.859 OSTEOPENIA OF HIP, UNSPECIFIED LATERALITY: ICD-10-CM

## 2021-04-14 DIAGNOSIS — H02.843 SWOLLEN EYELID, RIGHT: ICD-10-CM

## 2021-04-14 DIAGNOSIS — N95.2 VAGINAL ATROPHY: ICD-10-CM

## 2021-04-14 DIAGNOSIS — E78.2 MIXED HYPERLIPIDEMIA: ICD-10-CM

## 2021-04-14 DIAGNOSIS — H02.846 SWOLLEN EYELID, LEFT: ICD-10-CM

## 2021-04-14 DIAGNOSIS — E55.9 VITAMIN D DEFICIENCY: ICD-10-CM

## 2021-04-14 LAB
ALBUMIN SERPL-MCNC: 5.1 G/DL (ref 3.5–5.2)
ALP BLD-CCNC: 52 U/L (ref 35–104)
ALT SERPL-CCNC: 14 U/L (ref 0–32)
ANION GAP SERPL CALCULATED.3IONS-SCNC: 14 MMOL/L (ref 7–16)
AST SERPL-CCNC: 18 U/L (ref 0–31)
BASOPHILS ABSOLUTE: 0.07 E9/L (ref 0–0.2)
BASOPHILS RELATIVE PERCENT: 1 % (ref 0–2)
BILIRUB SERPL-MCNC: 0.2 MG/DL (ref 0–1.2)
BILIRUBIN, POC: NEGATIVE
BLOOD URINE, POC: NEGATIVE
BUN BLDV-MCNC: 19 MG/DL (ref 8–23)
CALCIUM SERPL-MCNC: 10.3 MG/DL (ref 8.6–10.2)
CHLORIDE BLD-SCNC: 104 MMOL/L (ref 98–107)
CHOLESTEROL, TOTAL: 241 MG/DL (ref 0–199)
CLARITY, POC: CLEAR
CO2: 25 MMOL/L (ref 22–29)
COLOR, POC: YELLOW
CREAT SERPL-MCNC: 0.7 MG/DL (ref 0.5–1)
EOSINOPHILS ABSOLUTE: 0.29 E9/L (ref 0.05–0.5)
EOSINOPHILS RELATIVE PERCENT: 4 % (ref 0–6)
GFR AFRICAN AMERICAN: >60
GFR NON-AFRICAN AMERICAN: >60 ML/MIN/1.73
GLUCOSE BLD-MCNC: 113 MG/DL (ref 74–99)
GLUCOSE URINE, POC: NEGATIVE
HCT VFR BLD CALC: 44.1 % (ref 34–48)
HDLC SERPL-MCNC: 88 MG/DL
HEMOGLOBIN: 13.9 G/DL (ref 11.5–15.5)
IMMATURE GRANULOCYTES #: 0.02 E9/L
IMMATURE GRANULOCYTES %: 0.3 % (ref 0–5)
KETONES, POC: NEGATIVE
LDL CHOLESTEROL CALCULATED: 141 MG/DL (ref 0–99)
LEUKOCYTE EST, POC: NEGATIVE
LYMPHOCYTES ABSOLUTE: 2.15 E9/L (ref 1.5–4)
LYMPHOCYTES RELATIVE PERCENT: 29.5 % (ref 20–42)
MCH RBC QN AUTO: 31 PG (ref 26–35)
MCHC RBC AUTO-ENTMCNC: 31.5 % (ref 32–34.5)
MCV RBC AUTO: 98.2 FL (ref 80–99.9)
MONOCYTES ABSOLUTE: 0.47 E9/L (ref 0.1–0.95)
MONOCYTES RELATIVE PERCENT: 6.5 % (ref 2–12)
NEUTROPHILS ABSOLUTE: 4.28 E9/L (ref 1.8–7.3)
NEUTROPHILS RELATIVE PERCENT: 58.7 % (ref 43–80)
NITRITE, POC: NEGATIVE
PDW BLD-RTO: 13.4 FL (ref 11.5–15)
PH, POC: 7
PLATELET # BLD: 302 E9/L (ref 130–450)
PMV BLD AUTO: 10.9 FL (ref 7–12)
POTASSIUM SERPL-SCNC: 4.6 MMOL/L (ref 3.5–5)
PROTEIN, POC: NEGATIVE
RBC # BLD: 4.49 E12/L (ref 3.5–5.5)
SODIUM BLD-SCNC: 143 MMOL/L (ref 132–146)
SPECIFIC GRAVITY, POC: 1.01
TOTAL PROTEIN: 7.5 G/DL (ref 6.4–8.3)
TRIGL SERPL-MCNC: 58 MG/DL (ref 0–149)
UROBILINOGEN, POC: 0.2
VITAMIN D 25-HYDROXY: 43 NG/ML (ref 30–100)
VLDLC SERPL CALC-MCNC: 12 MG/DL
WBC # BLD: 7.3 E9/L (ref 4.5–11.5)

## 2021-04-14 PROCEDURE — 81003 URINALYSIS AUTO W/O SCOPE: CPT | Performed by: FAMILY MEDICINE

## 2021-04-14 PROCEDURE — 99214 OFFICE O/P EST MOD 30 MIN: CPT | Performed by: FAMILY MEDICINE

## 2021-04-14 ASSESSMENT — ENCOUNTER SYMPTOMS
NAUSEA: 0
DIARRHEA: 0
WHEEZING: 0
CONSTIPATION: 0
SORE THROAT: 0
VOMITING: 0
SHORTNESS OF BREATH: 0
RHINORRHEA: 0
ABDOMINAL PAIN: 0

## 2021-04-14 NOTE — PROGRESS NOTES
2021     Chief Complaint   Patient presents with    Discuss Medications     dmannose     HPI  Sonya Gil (:  1960) is a 63 y.o. female, here for evaluation of the following medical concerns:    History of allergic reaction w/ swelling of eyelids. Felt secondary to gluten/refined grains sensitivity. No refined/processed foods.      DEXA scan: Patient reports osteopenia in her hip. Patient did start 2000 international units of vitamin D daily.     Vaginal atrophy: Patient is following with ObGyn for this issue. Using vaginal cream twice weekly. Stable.     History of recurrent UTIs: Regards this issue patient has been seeing ObGyn and was placed on estradiol topically for such. No recurrent UTIs since her last office appointment. Started on a probiotic and plans to start d-mannose. Osteopenia: At previous apt advised on Vit D w/ Vit K.     HLD: Advised on brazil nut 1 per day.      Health maintenance: Patient does follow with OB/GYN. Up-to-date on all of her other issues. Patient did not get her flu shot.     Review of Systems   Constitutional: Negative for chills and fever. HENT: Negative for congestion, rhinorrhea and sore throat. Respiratory: Negative for shortness of breath and wheezing. Cardiovascular: Negative for chest pain and leg swelling. Gastrointestinal: Negative for abdominal pain, constipation, diarrhea, nausea and vomiting. Skin: Negative for rash. Neurological: Negative for light-headedness and headaches. No past medical history on file. Prior to Visit Medications    Medication Sig Taking?  Authorizing Provider   Lactobacillus (FLORAJEN ACIDOPHILUS PO) Take by mouth Yes Historical Provider, MD   Menaquinone-7 (VITAMIN K2 PO) Take by mouth Yes Historical Provider, MD   Cholecalciferol (VITAMIN D) 50 MCG ( UT) CAPS capsule Take by mouth Yes Historical Provider, MD   Estradiol (IMVEXXY MAINTENANCE PACK) 4 MCG INST Place vaginally Twice a Week Yes Historical Provider, MD   Calcium Carb-Cholecalciferol (CALCIUM+D3) 600-800 MG-UNIT TABS Take by mouth Yes Historical Provider, MD   Multiple Vitamin (MULTI-VITAMIN PO) Take by mouth Yes Historical Provider, MD        No Known Allergies    Social History     Tobacco Use    Smoking status: Never Smoker    Smokeless tobacco: Never Used   Substance Use Topics    Alcohol use: Never     Frequency: Never           Vitals:    04/14/21 0806   BP: 110/78   Pulse: 61   Resp: 18   Temp: 97.3 °F (36.3 °C)   TempSrc: Temporal   SpO2: 100%   Weight: 136 lb (61.7 kg)   Height: 5' 8\" (1.727 m)     Estimated body mass index is 20.68 kg/m² as calculated from the following:    Height as of this encounter: 5' 8\" (1.727 m). Weight as of this encounter: 136 lb (61.7 kg). Physical Exam  Constitutional:       Appearance: She is well-developed. HENT:      Head: Normocephalic. Eyes:      Conjunctiva/sclera: Conjunctivae normal.      Pupils: Pupils are equal, round, and reactive to light. Cardiovascular:      Rate and Rhythm: Normal rate and regular rhythm. Heart sounds: Normal heart sounds. No murmur. Pulmonary:      Effort: Pulmonary effort is normal.      Breath sounds: Normal breath sounds. No wheezing or rales. Abdominal:      General: Bowel sounds are normal.      Palpations: Abdomen is soft. Tenderness: There is no abdominal tenderness. Musculoskeletal:      Right lower leg: No edema. Left lower leg: No edema. Neurological:      General: No focal deficit present. Mental Status: She is alert. Comments: Cranial nerves grossly intact   Psychiatric:         Mood and Affect: Mood normal.         Judgment: Judgment normal.         ASSESSMENT/PLAN:  Toya Melgar was seen today for discuss medications. Diagnoses and all orders for this visit:    Recurrent UTI  -     POCT Urinalysis No Micro (Auto)  Improved with modification of the patient's diet along with the addition of a daily probiotic.

## 2021-05-03 ENCOUNTER — TELEPHONE (OUTPATIENT)
Dept: PRIMARY CARE CLINIC | Age: 61
End: 2021-05-03

## 2021-05-03 DIAGNOSIS — N64.4 BREAST PAIN, LEFT: Primary | ICD-10-CM

## 2021-05-03 NOTE — TELEPHONE ENCOUNTER
Please notify the patient that a mammogram order has been placed. She may schedule this at any [facility] facility. Please have the patient keep an eye out for any rash that may develop as this type of pain may be a sign of shingles. Patient may see me for an appointment this week if symptoms do not improve.  With any significant chest pain patient should proceed to the ER    Emeterio Campbell MD

## 2021-05-03 NOTE — TELEPHONE ENCOUNTER
Patient calling in with c/o severe burning and stabbing pain in her left breast. The pain is constant. She states there are no lumps or discharge present. She has to wear a supportive bra 24/7 to decrease the pain. She would like an order for a mammogram. She will go to any facility you recommend.

## 2021-05-04 ENCOUNTER — HOSPITAL ENCOUNTER (OUTPATIENT)
Dept: GENERAL RADIOLOGY | Age: 61
Discharge: HOME | End: 2021-05-06
Payer: COMMERCIAL

## 2021-05-04 DIAGNOSIS — N64.4 BREAST PAIN, LEFT: ICD-10-CM

## 2021-05-04 DIAGNOSIS — N64.4 PAIN OF LEFT BREAST: ICD-10-CM

## 2021-05-04 PROCEDURE — 76642 ULTRASOUND BREAST LIMITED: CPT

## 2021-05-04 PROCEDURE — G0279 TOMOSYNTHESIS, MAMMO: HCPCS

## 2021-09-08 ENCOUNTER — TELEPHONE (OUTPATIENT)
Dept: PRIMARY CARE CLINIC | Age: 61
End: 2021-09-08

## 2021-09-08 ENCOUNTER — OFFICE VISIT (OUTPATIENT)
Dept: PRIMARY CARE CLINIC | Age: 61
End: 2021-09-08
Payer: COMMERCIAL

## 2021-09-08 VITALS
WEIGHT: 136 LBS | BODY MASS INDEX: 20.61 KG/M2 | TEMPERATURE: 97.4 F | OXYGEN SATURATION: 97 % | HEART RATE: 53 BPM | HEIGHT: 68 IN | RESPIRATION RATE: 18 BRPM | SYSTOLIC BLOOD PRESSURE: 138 MMHG | DIASTOLIC BLOOD PRESSURE: 84 MMHG

## 2021-09-08 DIAGNOSIS — M25.511 ACUTE PAIN OF RIGHT SHOULDER: Primary | ICD-10-CM

## 2021-09-08 DIAGNOSIS — M75.101 NONTRAUMATIC TEAR OF RIGHT ROTATOR CUFF, UNSPECIFIED TEAR EXTENT: Primary | ICD-10-CM

## 2021-09-08 DIAGNOSIS — M25.511 ACUTE PAIN OF RIGHT SHOULDER: ICD-10-CM

## 2021-09-08 PROCEDURE — 99213 OFFICE O/P EST LOW 20 MIN: CPT | Performed by: FAMILY MEDICINE

## 2021-09-08 RX ORDER — CYCLOBENZAPRINE HCL 5 MG
5 TABLET ORAL 2 TIMES DAILY PRN
Qty: 10 TABLET | Refills: 0 | Status: SHIPPED | OUTPATIENT
Start: 2021-09-08 | End: 2021-09-18

## 2021-09-08 SDOH — ECONOMIC STABILITY: FOOD INSECURITY: WITHIN THE PAST 12 MONTHS, YOU WORRIED THAT YOUR FOOD WOULD RUN OUT BEFORE YOU GOT MONEY TO BUY MORE.: NEVER TRUE

## 2021-09-08 SDOH — ECONOMIC STABILITY: FOOD INSECURITY: WITHIN THE PAST 12 MONTHS, THE FOOD YOU BOUGHT JUST DIDN'T LAST AND YOU DIDN'T HAVE MONEY TO GET MORE.: NEVER TRUE

## 2021-09-08 ASSESSMENT — ENCOUNTER SYMPTOMS
CONSTIPATION: 0
DIARRHEA: 0
NAUSEA: 0
SORE THROAT: 0
ABDOMINAL PAIN: 0
WHEEZING: 0
SHORTNESS OF BREATH: 0
RHINORRHEA: 0
VOMITING: 0

## 2021-09-08 ASSESSMENT — SOCIAL DETERMINANTS OF HEALTH (SDOH): HOW HARD IS IT FOR YOU TO PAY FOR THE VERY BASICS LIKE FOOD, HOUSING, MEDICAL CARE, AND HEATING?: NOT HARD AT ALL

## 2021-09-08 NOTE — TELEPHONE ENCOUNTER
Patient was at office today and stated pcp advised her he would call in a cream and a muscle relaxer. No cream was sent in. She is asking if pcp is sending something in or if she was supposed to pick something up OTC. Please advise.

## 2021-09-08 NOTE — PROGRESS NOTES
2021     Chief Complaint   Patient presents with    Shoulder Pain     right, thought she slept wrong about 1 month ago, pain getting worse-unable to lift right arm-shoulder blade area     HPI  Nadeem Mandujano (:  1960) is a 57 y.o. female, here for evaluation of the following medical concerns:    Patient is a 59-year-old female with a past medical history of allergic reactions, osteopenia, vaginal atrophy, recurrent UTIs, hyperlipidemia who presents today in regards to an acute issue. Patient reports that she thought that she had slept wrong approximately 1 month ago. Patient has had worsening pain and reports that she is unable to now lift her right arm. Pain is primarily in the shoulder blade region. Has waxed and wayned. Worse over the last 3 weeks. Patient cannot recall any traumas or injuries. Patient reports difficulty raising her hand over her head. Tried topical and over-the-counter medications with only modest benefit. Review of Systems   Constitutional: Negative for chills and fever. HENT: Negative for congestion, rhinorrhea and sore throat. Respiratory: Negative for shortness of breath and wheezing. Cardiovascular: Negative for chest pain and leg swelling. Gastrointestinal: Negative for abdominal pain, constipation, diarrhea, nausea and vomiting. Musculoskeletal: Positive for arthralgias. Negative for neck pain. Skin: Negative for rash. Neurological: Negative for light-headedness and headaches. No past medical history on file. Prior to Visit Medications    Medication Sig Taking?  Authorizing Provider   cyclobenzaprine (FLEXERIL) 5 MG tablet Take 1 tablet by mouth 2 times daily as needed for Muscle spasms Yes Aiyana Cristobal MD   Lactobacillus (FLORAJEN ACIDOPHILUS PO) Take by mouth Yes Historical Provider, MD   Cholecalciferol (VITAMIN D) 50 MCG (2000) CAPS capsule Take by mouth Yes Historical Provider, MD   Calcium Carb-Cholecalciferol (CALCIUM+D3) 600-800 MG-UNIT TABS Take by mouth Yes Historical Provider, MD   Multiple Vitamin (MULTI-VITAMIN PO) Take by mouth Yes Historical Provider, MD        No Known Allergies    Social History     Tobacco Use    Smoking status: Never Smoker    Smokeless tobacco: Never Used   Substance Use Topics    Alcohol use: Never           Vitals:    09/08/21 0951   BP: 138/84   Pulse: 53   Resp: 18   Temp: 97.4 °F (36.3 °C)   TempSrc: Temporal   SpO2: 97%   Weight: 136 lb (61.7 kg)   Height: 5' 8\" (1.727 m)     Estimated body mass index is 20.68 kg/m² as calculated from the following:    Height as of this encounter: 5' 8\" (1.727 m). Weight as of this encounter: 136 lb (61.7 kg). Physical Exam  Constitutional:       Appearance: She is well-developed. HENT:      Head: Normocephalic. Eyes:      Extraocular Movements: Extraocular movements intact. Conjunctiva/sclera: Conjunctivae normal.   Cardiovascular:      Rate and Rhythm: Normal rate and regular rhythm. Heart sounds: Normal heart sounds. No murmur heard. Pulmonary:      Effort: Pulmonary effort is normal.      Breath sounds: Normal breath sounds. No wheezing or rales. Abdominal:      Palpations: Abdomen is soft. Musculoskeletal:      Right shoulder: Tenderness present. Decreased range of motion. Decreased strength. Right lower leg: No edema. Left lower leg: No edema. Comments: Positive rotator cuff testing. Pain over scapula diffusely. Neurological:      General: No focal deficit present. Mental Status: She is alert. Comments: Cranial nerves grossly intact   Psychiatric:         Mood and Affect: Mood normal.         Judgment: Judgment normal.         ASSESSMENT/PLAN:  Celine Novak was seen today for shoulder pain. Diagnoses and all orders for this visit:    Nontraumatic tear of right rotator cuff, unspecified tear extent  -     Cancel: XR SHOULDER RIGHT (MIN 2 VIEWS);  Future  -     Anderson MD [facility], [place] [address]. - Physical Therapy, [address]  -     cyclobenzaprine (FLEXERIL) 5 MG tablet; Take 1 tablet by mouth 2 times daily as needed for Muscle spasms  -     XR SHOULDER RIGHT (MIN 2 VIEWS); Future    Acute pain of right shoulder  -     Cancel: XR SHOULDER RIGHT (MIN 2 VIEWS); Future  -     Charley Brice MD, [facility], [place] [address]. - Physical Therapy, [address]  -     cyclobenzaprine (FLEXERIL) 5 MG tablet; Take 1 tablet by mouth 2 times daily as needed for Muscle spasms  -     XR SHOULDER RIGHT (MIN 2 VIEWS); Future    Side effects of above medications reviewed. Patient also advised to use diclofenac topically as needed. All questions and concerns answered. Patient to follow-up with orthopedics. Return as noted below if no improvement or any worsening of symptoms. Patient to continue other over-the-counter medications. Return if symptoms worsen or fail to improve. An electronicsignature was used to authenticate this note.     --Buzz Ma MD on 9/8/21 at 10:04 AM EDT

## 2021-09-16 ENCOUNTER — OFFICE VISIT (OUTPATIENT)
Dept: ORTHOPEDIC SURGERY | Age: 61
End: 2021-09-16
Payer: COMMERCIAL

## 2021-09-16 VITALS — WEIGHT: 130 LBS | HEIGHT: 68 IN | BODY MASS INDEX: 19.7 KG/M2

## 2021-09-16 DIAGNOSIS — M25.511 RIGHT SHOULDER PAIN, UNSPECIFIED CHRONICITY: Primary | ICD-10-CM

## 2021-09-16 PROCEDURE — 99202 OFFICE O/P NEW SF 15 MIN: CPT | Performed by: ORTHOPAEDIC SURGERY

## 2021-09-16 RX ORDER — NAPROXEN SODIUM 220 MG
220 TABLET ORAL 2 TIMES DAILY WITH MEALS
COMMUNITY
End: 2022-08-01 | Stop reason: CLARIF

## 2021-09-16 NOTE — PROGRESS NOTES
Chief Complaint:   Chief Complaint   Patient presents with    Shoulder Pain     Pain right shoulder blade x 2 months. Denies injury. Woke up in the middle of the night with the pain. Has difficulty lifting arm and reaching behind her back. Xrays in epic. Was taking Flexeril and Diclofenac with no relief. Currently taking Aleve. States it gives her some relief. ESTEFANIA De is a 57 y.o. female, who presents with a 2-month history of essentially spontaneous and diffuse shoulder pain mostly posteriorly and medial border of the scapula, no history of injury, no previous problems with the shoulder no other joint complaints. Has taken some over-the-counter's with partial relief. Reports no radiating numbness or tingling in the right hand. 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 Shoulder pain has been associated more recently with subjective feeling of stiffness interfering with daily activities as well as sleep. Allergies; medications; past medical, surgical, family, and social history; and problem list have been reviewed today and updated as indicated in this encounter - see below following Ortho specifics. Musculoskeletal: Healthy-appearing middle-aged female, left upper extremity normal, right shoulder shows full active and passive motion although uncomfortable at the extremes of abduction and especially internal rotation, cuff functions are intact. No apparent effusion or erythema at the shoulder. Tenderness palpation noted along the medial border of the scapula although scapular position is satisfactory without winging. No objective motor or sensory deficits in the right hand.     Radiologic Studies: Multiple view x-rays of the right shoulder reviewed in [facility], joint spaces and subacromial space are preserved without spur formation narrowing or periarticular calcification interpreted as normal.    ASSESSMENT/PLAN:    Oliver Ledesma was seen today for shoulder pain. Diagnoses and all orders for this visit:    Right shoulder pain, unspecified chronicity       Impressions myofascial pain right shoulder, possible atypical adhesive capsulitis. Treatment options were reviewed, patient was empirically referred to physical therapy, should continue with over-the-counter nonsteroidals to tolerance, we talked about indication for advanced imaging if symptoms are refractory and if that is the case of come back in a month repeat exam and further discussion. Return in about 1 month (around 10/16/2021). Kristine Chavez MD    9/16/2021  5:32 PM      Patient Active Problem List   Diagnosis    Swollen eyelid, left    Recurrent UTI    Vaginal atrophy    Swollen eyelid, right       History reviewed. No pertinent past medical history. Past Surgical History:   Procedure Laterality Date    BREAST BIOPSY         Current Outpatient Medications   Medication Sig Dispense Refill    naproxen sodium (ALEVE) 220 MG tablet Take 220 mg by mouth 2 times daily (with meals)      diclofenac sodium (VOLTAREN) 1 % GEL Apply 2 g topically 4 times daily as needed for Pain 100 g 1    Lactobacillus (FLORAJEN ACIDOPHILUS PO) Take by mouth daily       Cholecalciferol (VITAMIN D) 50 MCG (2000 UT) CAPS capsule Take by mouth      Calcium Carb-Cholecalciferol (CALCIUM+D3) 600-800 MG-UNIT TABS Take by mouth      Multiple Vitamin (MULTI-VITAMIN PO) Take by mouth daily       cyclobenzaprine (FLEXERIL) 5 MG tablet Take 1 tablet by mouth 2 times daily as needed for Muscle spasms (Patient not taking: Reported on 9/16/2021) 10 tablet 0     No current facility-administered medications for this visit.        No Known Allergies    Social History     Socioeconomic History    Marital status:      Spouse name: None    Number of children: None    Years of education: None    Highest education level: None   Occupational History  None   Tobacco Use    Smoking status: Never Smoker    Smokeless tobacco: Never Used   Substance and Sexual Activity    Alcohol use: Never    Drug use: Never    Sexual activity: None   Other Topics Concern    None   Social History Narrative    None     Social Determinants of Health     Financial Resource Strain: Low Risk     Difficulty of Paying Living Expenses: Not hard at all   Food Insecurity: No Food Insecurity    Worried About Running Out of Food in the Last Year: Never true    [address] in the Last Year: Never true   Transportation Needs:     Lack of Transportation (Medical):  Lack of Transportation (Non-Medical):    Physical Activity:     Days of Exercise per Week:     Minutes of Exercise per Session:    Stress:     Feeling of Stress :    Social Connections:     Frequency of Communication with Friends and Family:     Frequency of Social Gatherings with Friends and Family:     Attends Confucianist Services:     Active Member of Clubs or Organizations:     Attends Club or Organization Meetings:     Marital Status:    Intimate Partner Violence:     Fear of Current or Ex-Partner:     Emotionally Abused:     Physically Abused:     Sexually Abused:        No family history on file. Review of Systems  As follows except as previously noted in HPI:  Constitutional: Negative for chills, diaphoresis, fatigue, fever and unexpected weight change. Respiratory: Negative for cough, shortness of breath and wheezing. Cardiovascular: Negative for chest pain and palpitations. Neurological: Negative for dizziness, syncope, cephalgia. GI / : negative  Musculoskeletal: see HPI       Objective:   Physical Exam   Constitutional: Oriented to person, place, and time. and appears well-developed and well-nourished. :   Head: Normocephalic and atraumatic. Eyes: EOM are normal.   Neck: Neck supple. Cardiovascular: Normal rate and regular rhythm.     Pulmonary/Chest: Effort normal. No stridor. No respiratory distress, no wheezes. Abdominal:  No abnormal distension. Neurological: Alert and oriented to person, place, and time. Skin: Skin is warm and dry. Psychiatric: Normal mood and affect.  Behavior is normal. Thought content normal.

## 2021-09-23 ENCOUNTER — EVALUATION (OUTPATIENT)
Dept: PHYSICAL THERAPY | Age: 61
End: 2021-09-23
Payer: COMMERCIAL

## 2021-09-23 DIAGNOSIS — M25.511 ACUTE PAIN OF RIGHT SHOULDER: Primary | ICD-10-CM

## 2021-09-23 PROCEDURE — 97140 MANUAL THERAPY 1/> REGIONS: CPT | Performed by: PHYSICAL THERAPIST

## 2021-09-23 PROCEDURE — 97161 PT EVAL LOW COMPLEX 20 MIN: CPT | Performed by: PHYSICAL THERAPIST

## 2021-09-23 NOTE — PROGRESS NOTES
[address] [facility]   Phone: [phone]   Fax: [phone]      Physical Therapy Daily Treatment Note    Date: 2021  Patient Name: Lawanda Marc  : 1960   MRN: [id number]  DOInjury: 2021  DOSx: 2021   Referring Provider: Brooke Welch MD  [address] [facility],  [facility] [facility]     Medical Diagnosis:     M75.101 (ICD-10-CM) - Nontraumatic tear of right rotator cuff, unspecified tear extent   M25.511 (ICD-10-CM) - Acute pain of right shoulder         Outcome Measure: QuickDASH 61% Disability    S: Pt c/o worsening right scap pain with limited ability with ADLs. O: Please refer to PT Sharmin 2021  Time [id number]     Visit  Repeat outcome measure at mid point and end. Pain      Strength      Palpation      ROM      Modalities            Manual Katy Mobs right sh distraction, quadrant, & AP Michael Mobs MWMs right sh abd MT    Manual soft tissue mobilization thoracic region & scap region  MT         Stretch      Table slides      Wall Flexion      Wall ER stretch      Towel IR stretch      IR reaching behind back      Exercise      Shrugs AROM      Pendulum Ex      UBE      Pulleys - flex      Pulleys-IR      Supine wand chest press      Supine wand flex      Supine wand ER/IR      Supine flexion      S-lying ABD      S-lying ER      Standing wand flex      Standing flexion      Standing ABD            ROWS: H Functional activities  To aid in ROM and strength needed for reaching , lifting ,pushing and pulling at home/work    ROWS: M  \"    ROWS: L  \"    ER  \"    IR  \"                A:  Tolerated well. Above added to written HEP.     P: Continue with rehab plan    Margaretta Dakins, PT [address]    Treatment Charges: Mins Units   Initial Evaluation 20 1   Re-Evaluation     Ther Exercise         TE     Manual Therapy     MT 20 1   Ther Activities        TA     Gait Training          GT     Neuro Re-education NR     Modalities     Non-Billable Service Time     Other     Total Time/Units 40 2

## 2021-09-23 NOTE — PROGRESS NOTES
[address] [facility]   Phone: [phone]   Fax: [phone]           Date:  2021   Patient: Alexus Celestin  : 1960  MRN: [id number]  Referring Provider: Odalis Zapata MD  [address],  [facility] [address] [facility]     Medical Diagnosis:     M75.101 (ICD-10-CM) - Nontraumatic tear of right rotator cuff, unspecified tear extent   M25.511 (ICD-10-CM) - Acute pain of right shoulder          SUBJECTIVE:     Onset date: 2021    Onset[de-identified] Insidious onset    Mechanism of Injury / History: The pt reports that she awoke one evening with left sh & scap pain 2-3mths ago without any known mechanism of injury. The pt reports progressive & worsening symptoms since date of onset. The pt reports difficulty with ADLs & self limited use of RUE. Jose Roberto Tate Patient is right handed. Previous PT: none    Medical Management for Current Problem: pt referred to orthopedic physician    Chief complaint: pain, decreased motion, weakness, inability / limited ability to use arm, limited ability to lift/carry/handle material    Behavior: condition is getting worse    Pain: constant  Current: 4/10     Best: 3/10     Worst:9/10    Aggravated by: reaching overhead, reaching out, reaching behind back, lifting/carrying/material handling    Relieved by: rest    Symptom Type/Quality: sharp, dull, aching, throbbing, shooting, piercing  Location[de-identified] right scap        Imaging results: XR SHOULDER RIGHT (MIN 2 VIEWS)    Result Date: 2021  EXAMINATION: THREE XRAY VIEWS OF THE RIGHT SHOULDER 2021 9:33 am COMPARISON: None. HISTORY: ORDERING SYSTEM PROVIDED HISTORY: Nontraumatic tear of right rotator cuff, unspecified tear extent TECHNOLOGIST PROVIDED HISTORY: Reason for exam:->Right shoulder pain FINDINGS: Minimal glenohumeral osteoarthritis. No fracture or dislocation. Normal soft tissues. Minimal osteoarthritis at the glenohumeral joint.        Past Medical History:  No past medical history on file.  Past Surgical History:   Procedure Laterality Date    BREAST BIOPSY         Medications:   Current Outpatient Medications   Medication Sig Dispense Refill    naproxen sodium (ALEVE) 220 MG tablet Take 220 mg by mouth 2 times daily (with meals)      diclofenac sodium (VOLTAREN) 1 % GEL Apply 2 g topically 4 times daily as needed for Pain 100 g 1    Lactobacillus (FLORAJEN ACIDOPHILUS PO) Take by mouth daily       Cholecalciferol (VITAMIN D) 50 MCG (2000 UT) CAPS capsule Take by mouth      Calcium Carb-Cholecalciferol (CALCIUM+D3) 600-800 MG-UNIT TABS Take by mouth      Multiple Vitamin (MULTI-VITAMIN PO) Take by mouth daily        No current facility-administered medications for this visit. Occupation: Pt is employed as  of [employer].    Exercise regimen: none    Patient Goals: pain relief, return to prior activity, full use of arm    Precautions/Contraindications: none    OBJECTIVE:     Observations: well nourished female    Inspection: Moderate forward right sh. Elevated & protracted right clavicle. Protraction right scapula. Palpation: pt presents with tenderness at right rhomboids, middle trap, & mid thoracic erector spinae. Joint/Motion:  Right Shoulder:  AROM: 85° Forward elevation,  60° abduction, 40° ER,  IR to 45  PROM: 80° Forward elevation, 150° abduction,  75° ER,  60° IR      Strength:  Right Shoulder: within rom available 4-/5 to 4/5    Special Tests: SFMA: RUE Pattern One & Two Dysfunction & Painful    ASSESSMENT     Outcome Measure: QuickDASH (Disorders of the Arm, Shoulder, and Hand) 61% disability    Problems:    Pain reported 9/10   ROM decreased   Strength decreased   Decreased functional ability with work, use of right upper extremity, reaching, lifting, carrying    Reason for Skilled Care:  The pt presents with right sh pain, limited right sh arom & prom, right sh weakness, postural abnormalities, tenderness, & functional limitations due to pt's diagnosis. Therefore, I recommend that the pt requires the skilled services of outpt PT Rehab to achieve LTGs, restore & resolve her deficits & limitations, & facilitate return to priro level of function/activity. [x] There are no barriers affecting plan of care or recovery    [] Barriers to this patient's plan of care or recovery include. Domestic Concerns:  [x] No  [] Yes:    Short Term goals (2 weeks)   Decrease reported pain to 3/10   Increase ROM to 140 flex & abd   Increase Strength to 4+/5    Able to perform/complete the following functions/tasks: complete ADLs without diffiuclty   QuickDASH (Disorders of the Arm, Shoulder, and Hand) 30% disability    Long Term goals (4-6 weeks)   Decrease reported pain to 0/10   Increase ROM to WNL   Increase Strength to 5/5    Able to perform/complete the following functions/tasks: pt resumes prior level of function/activity   QuickDASH 0% disability   Independent with Home Exercise Programs    Rehab Potential: [x] Good  [] Fair  [] Poor    PLAN       Treatment Plan: 2x/wk x 4-6wks  [x] Therapeutic Exercise  [x] Therapeutic Activity  [x] Neuromuscular Re-education   [] Gait Training  [] Balance Training  [] Aerobic conditioning  [x] Manual Therapy  [] Massage/Fascial release   [] Work/Sport specific activities    [] Pain Neuroscience [x] Cold/hotpack  [x] Vasocompression  [x] Electrical Stimulation  [] Lumbar/Cervical Traction  [x] Ultrasound   [] Iontophoresis: 4 mg/mL Dexamethasone Sodium Phosphate 40-80 mAmin  [x] Dry Needling      [x] Instruction in HEP      []  Medication allergies reviewed for use of Dexamethasone Sodium Phosphate 4mg/ml  with iontophoresis treatments. Patient is not allergic.       The following CPT codes are likely to be used in the care of this patient: [address] PT Evaluation: Low Complexity   09955 Therapeutic Exercise   84320 Neuromuscular Re-Education   59101 Therapeutic Activities   [facility] Electrical Stimulation      Suggested Professional Referral: [x] No  [] Yes:     Patient Education:  [x] Plans/Goals, Risks/Benefits discussed  [x] Home exercise program  Method of Education: [x] Verbal  [x] Demo  [x] Written  Comprehension of Education:  [x] Verbalizes understanding. [x] Demonstrates understanding. [] Needs Review. [] Demonstrates/verbalizes understanding of HEP/Ed previously given. Frequency:  2 days per week for 4-6 weeks    Patient understands diagnosis/prognosis and consents to treatment, plan and goals: [x] Yes    [] No     Thank you for the opportunity to work with your patient. If you have questions or comments, please contact me at numbers listed above. Electronically signed by: Lainey Agustin, [address] Medicare Patients Only     Please sign Physician's Certification and return to: [facility] [address]  [address]  Dept: [phone]  Dept Fax: [phone] Certification / Comments     Frequency/Duration 2 days per week for 4-6 weeks. Certification period from 9/23/2021  to 11/23/2021. I have reviewed the Plan of Care established for skilled therapy services and certify that the services are required and that they will be provided while the patient is under my care.     Physician's Comments/Revisions:               Physician's Printed Name:                                           [de-identified] Signature:                                                               Date:

## 2021-09-27 ENCOUNTER — TREATMENT (OUTPATIENT)
Dept: PHYSICAL THERAPY | Age: 61
End: 2021-09-27
Payer: COMMERCIAL

## 2021-09-27 DIAGNOSIS — M25.511 ACUTE PAIN OF RIGHT SHOULDER: Primary | ICD-10-CM

## 2021-09-27 PROCEDURE — 97140 MANUAL THERAPY 1/> REGIONS: CPT

## 2021-09-27 PROCEDURE — 97110 THERAPEUTIC EXERCISES: CPT

## 2021-09-27 NOTE — PROGRESS NOTES
[address] [facility]   Phone: [phone]   Fax: [phone]      Physical Therapy Daily Treatment Note    Date: 2021  Patient Name: Dora Lundberg  : 1960   MRN: [id number]  DOInjury: 2021  DOSx: 2021   Referring Provider: Meggan Sharma MD  [facility] [address] [facility],  [facility] [address] [facility]     Medical Diagnosis:     M75.101 (ICD-10-CM) - Nontraumatic tear of right rotator cuff, unspecified tear extent   M25.511 (ICD-10-CM) - Acute pain of right shoulder         Outcome Measure: QuickDASH 61% Disability    S: Pt reports difficulty performing ADLs. She states she has difficulty sleeping at night. O: Please refer to PT Sharmin 2021  Time 3427-1008     Visit  Repeat outcome measure at mid point and end. Pain      Strength      Palpation      ROM      Modalities            Manual MT   Manual soft tissue mobilization thoracic region & scap region             15 mins   MT         Stretch            Table slides 10s x 10  Flex/scap  TE   SB roll-outs  10s x10 Flexion TE   Kait's  2 mins each  Flex/Scap TE   Wand activity  cnp discomfort laying supine      Pole Rhomboid stretch             Exercise      Shrugs AROM      Pendulum Ex      UBE      Pulleys - flex      Pulleys-IR      Supine wand chest press      Supine wand flex      Supine wand ER/IR      Supine flexion      S-lying ABD      S-lying ER      Standing wand flex      Standing flexion      Standing ABD            ROWS: H Functional activities  To aid in ROM and strength needed for reaching , lifting ,pushing and pulling at home/work    ROWS: M  \"    ROWS: L  \"    ER  \"    IR  \"                A:  Tolerated well. Focused today's session on mobility/stretching in order to further improve UE ROM. Pt reports significant relief post manual treatment c improved sh mobility. Added above to I HEPs c good understanding of how to perform.      P: Continue with rehab plan  José Antonio Brody PTA [id number]    Treatment Charges: Mins Units   Initial Evaluation     Re-Evaluation     Ther Exercise         TE 25 2   Manual Therapy     MT 15 1   Ther Activities        TA     Gait Training          GT     Neuro Re-education NR     Modalities     Non-Billable Service Time     Other     Total Time/Units 40 3

## 2021-10-01 ENCOUNTER — TREATMENT (OUTPATIENT)
Dept: PHYSICAL THERAPY | Age: 61
End: 2021-10-01
Payer: COMMERCIAL

## 2021-10-01 DIAGNOSIS — M25.511 ACUTE PAIN OF RIGHT SHOULDER: Primary | ICD-10-CM

## 2021-10-01 PROCEDURE — 97140 MANUAL THERAPY 1/> REGIONS: CPT

## 2021-10-01 PROCEDURE — 97110 THERAPEUTIC EXERCISES: CPT

## 2021-10-01 NOTE — PROGRESS NOTES
[address] [facility]   Phone: [phone]   Fax: [phone]      Physical Therapy Daily Treatment Note    Date: 10/1/2021  Patient Name: Sarahi Barraza  : 1960   MRN: [id number]  DOInjury: 2021  DOSx: 2021   Referring Provider: Jaida Jackman MD  [address] [facility],  [address]      Medical Diagnosis:     M75.101 (ICD-10-CM) - Nontraumatic tear of right rotator cuff, unspecified tear extent   M25.511 (ICD-10-CM) - Acute pain of right shoulder         Outcome Measure: QuickDASH 61% Disability    S: Pt reports significant relief in shoulder blade after previous session. She states the relief only lasted until about 9pm that night. Pt states only discomfort noted in shoulder blade region 6/10. O: Please refer to PT Eval 2021  Time 715-800     Visit 3/8 Repeat outcome measure at mid point and end. Pain      Strength      Palpation      ROM      Modalities      MHP prior to session  5 mins  Medial border of the scapula  NC   Manual MT   Manual soft tissue mobilization thoracic region & scap region             15 mins   MT         Stretch            Flex/scap  TE   SB roll-outs  10s x10 Flexion/ scap to the L TE   Kait's  2 mins each  Flex/Scap TE   Wand activity  cnp discomfort laying supine      Pole Rhomboid stretch  10s x 10   TE   Prayer Stretch  10s x 10   TE   Open books  10s x 10   TE   Seated thoracic extension stretch 10s x 10  cnp increased pain  TE   Seated upper trunk rotation  x10   TE                     Exercise      Shrugs AROM      Pendulum Ex      UBE      Pulleys - flex      Pulleys-IR      Supine wand chest press      Supine wand flex      Supine wand ER/IR      Supine flexion      S-lying ABD      S-lying ER      Standing wand flex      Standing flexion      Standing ABD                                                      A:  Tolerated well. Pt presents to therapy c same c/o this session.  She states she feels all of her pain isolated near posterior medial border of the scapula region. Felt significant relief last session that lasted until about 9pm that night. She reports decreased pain levels post treatment session this day.     P: Continue with rehab plan  Shikha Ross PTA [id number]    Treatment Charges: Mins Units   Initial Evaluation     Re-Evaluation     Ther Exercise         TE 25 2   Manual Therapy     MT 15 1   Ther Activities        TA     Gait Training          GT     Neuro Re-education NR     Modalities     Non-Billable Service Time 5 0   Other     Total Time/Units 45 3

## 2021-10-04 ENCOUNTER — TREATMENT (OUTPATIENT)
Dept: PHYSICAL THERAPY | Age: 61
End: 2021-10-04
Payer: COMMERCIAL

## 2021-10-04 DIAGNOSIS — M25.511 ACUTE PAIN OF RIGHT SHOULDER: Primary | ICD-10-CM

## 2021-10-04 PROCEDURE — 97140 MANUAL THERAPY 1/> REGIONS: CPT | Performed by: PHYSICAL THERAPIST

## 2021-10-04 PROCEDURE — 97112 NEUROMUSCULAR REEDUCATION: CPT | Performed by: PHYSICAL THERAPIST

## 2021-10-04 NOTE — PROGRESS NOTES
[address] [facility] [address] [facility]   Phone: [phone]   Fax: [phone]      Physical Therapy Daily Treatment Note    Date: 10/4/2021  Patient Name: Shemar Nicholas  : 1960   MRN: [id number]  DOInjury: 2021  DOSx: 2021   Referring Provider: MD Arthur Mock,  [facility]      Medical Diagnosis:     M75.101 (ICD-10-CM) - Nontraumatic tear of right rotator cuff, unspecified tear extent   M25.511 (ICD-10-CM) - Acute pain of right shoulder         Outcome Measure: QuickDASH 61% Disability    S: The pt reports that she is able to sleep 2hrs at a time due to her c/o right scap pain. O: Please refer to PT Eval 2021  Time 0096-8420     Visit  Repeat outcome measure at mid point and end. Pain      Strength      Palpation      ROM      Modalities      Manual MT   Perkins Mobs right scap Manual right scap release trigger point soft tissue mobilization  MT   Stretch            Flex/scap  TE   Prone scap retract 3x 10  NR   Prone scap inferior glides 3x 10  NR   Prone sh ext  3x 10 Emphasis scap retract NR   Band sh horizontal abd 3x 10 yellow NR   Band scap rows high, mid, & low 3x 10 yellow NR   Band sh ext high & mid emphasis scap retract 3x 10 yellow NR   Band reverse flys 3x 10 yellow NR   Pulleys - flex      Pulleys-IR      Supine wand chest press      Supine wand flex      Supine wand ER/IR      Supine flexion      S-lying ABD      S-lying ER      Standing wand flex      Standing flexion      Standing ABD                                                      A:  Tolerated well. The pt progressed with today's Tx session to perform band resistive training & prone exercises to promote & facilitate proper muscle activation & scapulo-humeral rhythm. The pt displayed improved arom right sh following Tx session. P: Continue with rehab plan & progress to include seated lat pull down with band.     Atul Christy, [address]    Treatment Charges: Mins Units   Initial Evaluation     Re-Evaluation     Ther Exercise         TE     Manual Therapy     MT 15 1   Ther Activities        TA     Gait Training          GT     Neuro Re-education NR 25 2   Modalities     Non-Billable Service Time     Other     Total Time/Units 40 3

## 2021-11-02 ENCOUNTER — OFFICE VISIT (OUTPATIENT)
Dept: FAMILY MEDICINE CLINIC | Age: 61
End: 2021-11-02
Payer: COMMERCIAL

## 2021-11-02 VITALS
DIASTOLIC BLOOD PRESSURE: 60 MMHG | WEIGHT: 138 LBS | TEMPERATURE: 97.6 F | HEART RATE: 64 BPM | SYSTOLIC BLOOD PRESSURE: 126 MMHG | BODY MASS INDEX: 20.98 KG/M2 | OXYGEN SATURATION: 98 %

## 2021-11-02 DIAGNOSIS — R30.0 DYSURIA: ICD-10-CM

## 2021-11-02 DIAGNOSIS — R30.0 DYSURIA: Primary | ICD-10-CM

## 2021-11-02 LAB
BILIRUBIN, POC: NORMAL
BLOOD URINE, POC: NORMAL
CLARITY, POC: CLEAR
COLOR, POC: YELLOW
GLUCOSE URINE, POC: NORMAL
KETONES, POC: NORMAL
LEUKOCYTE EST, POC: NORMAL
NITRITE, POC: NORMAL
PH, POC: 6.5
PROTEIN, POC: NORMAL
SPECIFIC GRAVITY, POC: 1.01
UROBILINOGEN, POC: NORMAL

## 2021-11-02 PROCEDURE — 99214 OFFICE O/P EST MOD 30 MIN: CPT | Performed by: PHYSICIAN ASSISTANT

## 2021-11-02 PROCEDURE — 81002 URINALYSIS NONAUTO W/O SCOPE: CPT | Performed by: PHYSICIAN ASSISTANT

## 2021-11-02 RX ORDER — NITROFURANTOIN 25; 75 MG/1; MG/1
100 CAPSULE ORAL 2 TIMES DAILY
Qty: 14 CAPSULE | Refills: 0 | Status: SHIPPED | OUTPATIENT
Start: 2021-11-02 | End: 2021-11-09

## 2021-11-02 NOTE — PROGRESS NOTES
21  Parrish Baltazar : 1960 Sex: female  Age 57 y.o. Subjective:  Chief Complaint   Patient presents with    Dysuria         HPI:   Parrish Baltazar , 57 y.o. female presents to [facility] care for evaluation of burning with urination    HPI  19-year-old female presents to [facility] for evaluation of burning with urination. The patient has had the symptoms ongoing for last 4 days. The patient thought that she is developing another UTI. The patient is not having any significant vaginal bleeding or vaginal discharge. No blood in the urine. The patient states that she has not had UTIs for many years and then over the last couple of years they have been more frequent. The patient does hydrate adequately. The patient is not really having any side pain, back pain, flank pain. No nausea, vomiting, diarrhea. ROS:   Unless otherwise stated in this report the patient's positive and negative responses for review of systems for constitutional, eyes, ENT, cardiovascular, respiratory, gastrointestinal, neurological, , musculoskeletal, and integument systems and related systems to the presenting problem are either stated in the history of present illness or were not pertinent or were negative for the symptoms and/or complaints related to the presenting medical problem. Positives and pertinent negatives as per HPI. All others reviewed and are negative. PMH:   History reviewed. No pertinent past medical history. Past Surgical History:   Procedure Laterality Date    BREAST BIOPSY         History reviewed. No pertinent family history.     Medications:     Current Outpatient Medications:     nitrofurantoin, macrocrystal-monohydrate, (MACROBID) 100 MG capsule, Take 1 capsule by mouth 2 times daily for 7 days, Disp: 14 capsule, Rfl: 0    naproxen sodium (ALEVE) 220 MG tablet, Take 220 mg by mouth 2 times daily (with meals), Disp: , Rfl:     diclofenac sodium (VOLTAREN) 1 % GEL, Apply 2 g topically 4 times daily as needed for Pain, Disp: 100 g, Rfl: 1    Lactobacillus (FLORAJEN ACIDOPHILUS PO), Take by mouth daily , Disp: , Rfl:     Cholecalciferol (VITAMIN D) 50 MCG (2000 UT) CAPS capsule, Take by mouth, Disp: , Rfl:     Calcium Carb-Cholecalciferol (CALCIUM+D3) 600-800 MG-UNIT TABS, Take by mouth, Disp: , Rfl:     Multiple Vitamin (MULTI-VITAMIN PO), Take by mouth daily , Disp: , Rfl:     Allergies:   No Known Allergies    Social History:     Social History     Tobacco Use    Smoking status: Never Smoker    Smokeless tobacco: Never Used   Substance Use Topics    Alcohol use: Never    Drug use: Never       Patient lives at home. Physical Exam:     Vitals:    11/02/21 0909   BP: 126/60   Pulse: 64   Temp: 97.6 °F (36.4 °C)   SpO2: 98%   Weight: 138 lb (62.6 kg)       Exam:  Physical Exam  Nurses note and vital signs reviewed and patient is not hypoxic. General: The patient appears well and in no apparent distress. Patient is resting comfortably on cart. Skin: Warm, dry, no pallor noted. There is no rash noted. Head: Normocephalic, atraumatic. Eye: Normal conjunctiva  Ears, Nose, Mouth, and Throat: Wearing a mask  Cardiovascular: Regular Rate and Rhythm  Respiratory: Patient is in no distress, no accessory muscle use, lungs are clear to auscultation, no wheezing, crackles or rhonchi  Back: Non-tender, no CVA tenderness bilaterally to percussion. GI: Normal bowel sounds, no tenderness to palpation, no masses appreciated. No rebound, guarding, or rigidity noted.   Neurological: A&O x4, normal speech        Testing:     Results for orders placed or performed in visit on 11/02/21   POCT Urinalysis no Micro   Result Value Ref Range    Color, UA yellow     Clarity, UA clear     Glucose, UA POC neg     Bilirubin, UA neg     Ketones, UA neg     Spec Grav, UA 1.015     Blood, UA POC trace-lysed     pH, UA 6.5     Protein, UA POC neg     Urobilinogen, UA 0.2eu     Leukocytes, UA neg Nitrite, UA neg            Medical Decision Making:     Vital signs reviewed    Past medical history reviewed. Allergies reviewed. Medications reviewed. Patient on arrival does not appear to be in any apparent distress or discomfort. The patient has been seen and evaluated. The patient does not appear to be toxic or lethargic. The patient's urinalysis did not show any evidence of a urinary tract infection. We will set up a urine culture. The patient is symptomatic though. We will treat the patient with Bk Karyn. Will await the final culture. We will update the patient with results. She was comfortable with this plan. The patient understands plan has no other questions    The patient is to return to express care or go directly to the emergency department should any of the signs or symptoms worsen. The patient is to followup with primary care physician in 2-3 days for repeat evaluation. The patient has no other questions or concerns at this time the patient will be discharged home. Clinical Impression:   Fariba Vann was seen today for dysuria. Diagnoses and all orders for this visit:    Dysuria  -     POCT Urinalysis no Micro  -     Culture, Urine; Future    Other orders  -     nitrofurantoin, macrocrystal-monohydrate, (MACROBID) 100 MG capsule; Take 1 capsule by mouth 2 times daily for 7 days        The patient is to call for any concerns or return if any of the signs or symptoms worsen. The patient is to follow-up with PCP in the next 2-3 days for repeat evaluation repeat assessment or go directly to the emergency department.      SIGNATURE: Naomi Reyes III, PA-C

## 2021-11-04 LAB — URINE CULTURE, ROUTINE: NORMAL

## 2022-02-11 ENCOUNTER — OFFICE VISIT (OUTPATIENT)
Dept: FAMILY MEDICINE CLINIC | Age: 62
End: 2022-02-11
Payer: COMMERCIAL

## 2022-02-11 VITALS
OXYGEN SATURATION: 99 % | BODY MASS INDEX: 20.98 KG/M2 | DIASTOLIC BLOOD PRESSURE: 80 MMHG | SYSTOLIC BLOOD PRESSURE: 120 MMHG | HEIGHT: 68 IN | TEMPERATURE: 97.8 F | HEART RATE: 60 BPM | RESPIRATION RATE: 16 BRPM

## 2022-02-11 DIAGNOSIS — R30.0 BURNING WITH URINATION: ICD-10-CM

## 2022-02-11 DIAGNOSIS — R31.9 URINARY TRACT INFECTION WITH HEMATURIA, SITE UNSPECIFIED: Primary | ICD-10-CM

## 2022-02-11 DIAGNOSIS — N39.0 URINARY TRACT INFECTION WITH HEMATURIA, SITE UNSPECIFIED: Primary | ICD-10-CM

## 2022-02-11 LAB
BILIRUBIN, POC: NORMAL
BLOOD URINE, POC: NORMAL
CLARITY, POC: NORMAL
COLOR, POC: YELLOW
GLUCOSE URINE, POC: NORMAL
KETONES, POC: NORMAL
LEUKOCYTE EST, POC: NORMAL
NITRITE, POC: NORMAL
PH, POC: 6
PROTEIN, POC: NORMAL
SPECIFIC GRAVITY, POC: 1.02
UROBILINOGEN, POC: 0.2

## 2022-02-11 PROCEDURE — 99214 OFFICE O/P EST MOD 30 MIN: CPT | Performed by: NURSE PRACTITIONER

## 2022-02-11 PROCEDURE — 81002 URINALYSIS NONAUTO W/O SCOPE: CPT | Performed by: NURSE PRACTITIONER

## 2022-02-11 RX ORDER — SULFAMETHOXAZOLE AND TRIMETHOPRIM 800; 160 MG/1; MG/1
1 TABLET ORAL 2 TIMES DAILY
Qty: 6 TABLET | Refills: 0 | Status: SHIPPED | OUTPATIENT
Start: 2022-02-11 | End: 2022-02-14

## 2022-02-11 NOTE — PROGRESS NOTES
22  Alex Cancino : 1960 Sex: female  Age 57 y.o. Subjective:  Chief Complaint   Patient presents with    Urinary Frequency     x1 week with urinary freqency, burning       HPI:   Alex Cancino , 57 y.o. female presents to the clinic for evaluation of UTI symptoms x 7 days. Reports associated frequency, urgency, and suprapubic pressure. The patient has taken supplements for symptoms. The patient reports unchanged symptoms over time. Denies gross hematuria and denies associated flank pain. Denies vaginal discharge, vaginal bleeding, or lethargy. The patient also denies headache, fever, chest pain, abdominal pain, shortness of breath, and nausea / vomiting / diarrhea. No LMP recorded. Patient is postmenopausal.    ROS:   Unless otherwise stated in this report the patient's positive and negative responses for review of systems for constitutional, eyes, ENT, cardiovascular, respiratory, gastrointestinal, neurological, , musculoskeletal, and integument systems and related systems to the presenting problem are either stated in the history of present illness or were not pertinent or were negative for the symptoms and/or complaints related to the presenting medical problem. Positives and pertinent negatives as per HPI. All others reviewed and are negative. PMH:   History reviewed. No pertinent past medical history. Past Surgical History:   Procedure Laterality Date    BREAST BIOPSY         History reviewed. No pertinent family history.     Medications:     Current Outpatient Medications:     sulfamethoxazole-trimethoprim (BACTRIM DS) 800-160 MG per tablet, Take 1 tablet by mouth 2 times daily for 3 days, Disp: 6 tablet, Rfl: 0    naproxen sodium (ALEVE) 220 MG tablet, Take 220 mg by mouth 2 times daily (with meals), Disp: , Rfl:     diclofenac sodium (VOLTAREN) 1 % GEL, Apply 2 g topically 4 times daily as needed for Pain, Disp: 100 g, Rfl: 1    Lactobacillus (FLORAJEN ACIDOPHILUS PO), Take by mouth daily , Disp: , Rfl:     Cholecalciferol (VITAMIN D) 50 MCG (2000 UT) CAPS capsule, Take by mouth, Disp: , Rfl:     Calcium Carb-Cholecalciferol (CALCIUM+D3) 600-800 MG-UNIT TABS, Take by mouth, Disp: , Rfl:     Multiple Vitamin (MULTI-VITAMIN PO), Take by mouth daily , Disp: , Rfl:     Allergies:   No Known Allergies    Social History:     Social History     Tobacco Use    Smoking status: Never Smoker    Smokeless tobacco: Never Used   Substance Use Topics    Alcohol use: Never    Drug use: Never       Patient lives at home. Physical Exam:     Vitals:    02/11/22 0811   BP: 120/80   Pulse: 60   Resp: 16   Temp: 97.8 °F (36.6 °C)   SpO2: 99%   Height: 5' 8\" (1.727 m)       Physical Exam (PE)   Constitutional: Alert, development consistent with age. HENT:      Head: Normocephalic. Right Ear: External ear normal.      Left Ear: External ear normal.      Nose: Normal.      Mouth/Throat:     Mouth: Mucous membranes are moist.      Pharynx: Oropharynx is clear. Eyes: Pupils: Pupils are equal, round, and reactive to light. Neck: Normal ROM. Supple. Cardiovascular: Heart RRR without pathologic murmurs or gallops. Pulmonary: Respiratory effort normal.  Normal breath sounds. Abdomen: Soft, nondistended, with mild suprapubic tenderness. No rebound, rigidity, or guarding. BS+ X4. No organomegaly. Back: Negative CVA tenderness. Skin:  Normal turgor. Warm, dry, without visible rash, unless noted elsewhere. Neurological:  Alert and oriented. Motor functions intact. Responds to verbal commands.    Psychiatric: Mood and Affect: Mood normal. Behavior: Behavior normal    Testing:   (All laboratory and radiology results have been personally reviewed by myself)  Labs:  Results for orders placed or performed in visit on 02/11/22   POCT Urinalysis no Micro   Result Value Ref Range    Color, UA yellow     Clarity, UA cloudy     Glucose, UA POC neg     Bilirubin, UA neg     Ketones, UA neg Spec Grav, UA 1.025     Blood, UA POC trace     pH, UA 6.0     Protein, UA POC neg     Urobilinogen, UA 0.2     Leukocytes, UA small     Nitrite, UA neg        Imaging: All Radiology results interpreted by Radiologist unless otherwise noted. No orders to display       Assessment / Plan:   The patient's vitals, allergies, medications, and past medical history have been reviewed. Dorenda Olszewski was seen today for urinary frequency. Diagnoses and all orders for this visit:    Urinary tract infection with hematuria, site unspecified  -     sulfamethoxazole-trimethoprim (BACTRIM DS) 800-160 MG per tablet; Take 1 tablet by mouth 2 times daily for 3 days    Burning with urination  -     POCT Urinalysis no Micro  -     Culture, Urine; Future        - Disposition: Home    - Educational material printed for patient's review and were included in patient instructions. After Visit Summary and given to patient at the end of visit. - Urine C&S pending, will call with results once available. Discussed symptomatic treatments with patient today. Increase fluids and rest. F/u PCP in 3-5 days if symptoms persist. ED sooner if symptoms worsen or change. ED immediately with the development of fever, shaking chills, body aches, flank pain, vomiting, CP, or SOB. Pt is in agreement with this care plan. All questions answered. SIGNATURE: HAKAN Monique    *NOTE: This report was transcribed using voice recognition software. Every effort was made to ensure accuracy; however, inadvertent computerized transcription errors may be present.

## 2022-02-11 NOTE — PATIENT INSTRUCTIONS
sprays, and other feminine hygiene products that have deodorants. · After going to the bathroom, wipe from front to back. When should you call for help? Call your doctor now or seek immediate medical care if:    · Symptoms such as fever, chills, nausea, or vomiting get worse or appear for the first time.     · You have new pain in your back just below your rib cage. This is called flank pain.     · There is new blood or pus in your urine.     · You have any problems with your antibiotic medicine. Watch closely for changes in your health, and be sure to contact your doctor if:    · You are not getting better after taking an antibiotic for 2 days.     · Your symptoms go away but then come back. Where can you learn more? Go to https://Apportable.GiveForward. org and sign in to your [employer] account. Enter L664 in the [employer] box to learn more about \"Urinary Tract Infection (UTI) in Women: Care Instructions. \"     If you do not have an account, please click on the \"Sign Up Now\" link. Current as of: February 10, 2021               Content Version: 13.1  © 6764-9554 Healthwise, Incorporated. Care instructions adapted under license by [facility] ([facility]). If you have questions about a medical condition or this instruction, always ask your healthcare professional. [address] any warranty or liability for your use of this information.

## 2022-02-14 LAB
ORGANISM: ABNORMAL
URINE CULTURE, ROUTINE: ABNORMAL

## 2022-02-15 DIAGNOSIS — R31.9 URINARY TRACT INFECTION WITH HEMATURIA, SITE UNSPECIFIED: Primary | ICD-10-CM

## 2022-02-15 DIAGNOSIS — N39.0 URINARY TRACT INFECTION WITH HEMATURIA, SITE UNSPECIFIED: Primary | ICD-10-CM

## 2022-02-15 RX ORDER — CEFDINIR 300 MG/1
300 CAPSULE ORAL 2 TIMES DAILY
Qty: 14 CAPSULE | Refills: 0 | Status: SHIPPED | OUTPATIENT
Start: 2022-02-15 | End: 2022-02-22

## 2022-07-08 ENCOUNTER — TELEPHONE (OUTPATIENT)
Dept: PRIMARY CARE CLINIC | Age: 62
End: 2022-07-08

## 2022-07-08 NOTE — TELEPHONE ENCOUNTER
----- Message from Lyndsay Johnson sent at 7/8/2022  9:44 AM EDT -----  Subject: Referral Request    Reason for referral request? Patient needs to get a referral to get a   mammogram. She wants to go to [address] Please reach out to patient. Provider patient wants to be referred to(if known):     Provider Phone Number(if known):     Additional Information for Provider?   ---------------------------------------------------------------------------  --------------  [address]    [phone]; OK to leave message on voicemail  ---------------------------------------------------------------------------  --------------

## 2022-07-11 DIAGNOSIS — Z12.31 BREAST CANCER SCREENING BY MAMMOGRAM: Primary | ICD-10-CM

## 2022-07-12 ENCOUNTER — TELEPHONE (OUTPATIENT)
Dept: PRIMARY CARE CLINIC | Age: 62
End: 2022-07-12

## 2022-07-12 NOTE — TELEPHONE ENCOUNTER
----- Message from Guillermo Ocasio, [address] [place] sent at 7/12/2022  8:12 AM EDT -----  Subject: Referral Request    Reason for referral request? Health maintenance labs  Provider patient wants to be referred to(if known):     Provider Phone Number(if known): Additional Information for Provider? Pt has a physical appt on 8/1/22 @   10:30. Pt would like to have any/all necessary labs done prior to be   discussed at time of appt.  Please call pt to advise/schedule.  ---------------------------------------------------------------------------  --------------  [place]    [phone]; OK to leave message on voicemail  ---------------------------------------------------------------------------  --------------

## 2022-07-13 DIAGNOSIS — E78.2 MIXED HYPERLIPIDEMIA: ICD-10-CM

## 2022-07-13 DIAGNOSIS — Z00.00 WELL ADULT EXAM: Primary | ICD-10-CM

## 2022-07-13 DIAGNOSIS — E55.9 VITAMIN D DEFICIENCY: ICD-10-CM

## 2022-07-15 DIAGNOSIS — E55.9 VITAMIN D DEFICIENCY: ICD-10-CM

## 2022-07-15 DIAGNOSIS — E78.2 MIXED HYPERLIPIDEMIA: ICD-10-CM

## 2022-07-15 DIAGNOSIS — Z00.00 WELL ADULT EXAM: ICD-10-CM

## 2022-07-15 LAB
ALBUMIN SERPL-MCNC: 4.7 G/DL (ref 3.5–5.2)
ALP BLD-CCNC: 58 U/L (ref 35–104)
ALT SERPL-CCNC: 16 U/L (ref 0–32)
ANION GAP SERPL CALCULATED.3IONS-SCNC: 16 MMOL/L (ref 7–16)
AST SERPL-CCNC: 21 U/L (ref 0–31)
BASOPHILS ABSOLUTE: 0.06 E9/L (ref 0–0.2)
BASOPHILS RELATIVE PERCENT: 1.5 % (ref 0–2)
BILIRUB SERPL-MCNC: 0.3 MG/DL (ref 0–1.2)
BUN BLDV-MCNC: 21 MG/DL (ref 6–23)
CALCIUM SERPL-MCNC: 10 MG/DL (ref 8.6–10.2)
CHLORIDE BLD-SCNC: 105 MMOL/L (ref 98–107)
CHOLESTEROL, TOTAL: 221 MG/DL (ref 0–199)
CO2: 22 MMOL/L (ref 22–29)
CREAT SERPL-MCNC: 0.9 MG/DL (ref 0.5–1)
EOSINOPHILS ABSOLUTE: 0.26 E9/L (ref 0.05–0.5)
EOSINOPHILS RELATIVE PERCENT: 6.5 % (ref 0–6)
GFR AFRICAN AMERICAN: >60
GFR NON-AFRICAN AMERICAN: >60 ML/MIN/1.73
GLUCOSE BLD-MCNC: 95 MG/DL (ref 74–99)
HCT VFR BLD CALC: 41.4 % (ref 34–48)
HDLC SERPL-MCNC: 90 MG/DL
HEMOGLOBIN: 13.3 G/DL (ref 11.5–15.5)
IMMATURE GRANULOCYTES #: 0.01 E9/L
IMMATURE GRANULOCYTES %: 0.3 % (ref 0–5)
LDL CHOLESTEROL CALCULATED: 125 MG/DL (ref 0–99)
LYMPHOCYTES ABSOLUTE: 1.75 E9/L (ref 1.5–4)
LYMPHOCYTES RELATIVE PERCENT: 43.9 % (ref 20–42)
MCH RBC QN AUTO: 30.6 PG (ref 26–35)
MCHC RBC AUTO-ENTMCNC: 32.1 % (ref 32–34.5)
MCV RBC AUTO: 95.2 FL (ref 80–99.9)
MONOCYTES ABSOLUTE: 0.38 E9/L (ref 0.1–0.95)
MONOCYTES RELATIVE PERCENT: 9.5 % (ref 2–12)
NEUTROPHILS ABSOLUTE: 1.53 E9/L (ref 1.8–7.3)
NEUTROPHILS RELATIVE PERCENT: 38.3 % (ref 43–80)
PDW BLD-RTO: 13.1 FL (ref 11.5–15)
PLATELET # BLD: 284 E9/L (ref 130–450)
PMV BLD AUTO: 10.3 FL (ref 7–12)
POTASSIUM SERPL-SCNC: 4.7 MMOL/L (ref 3.5–5)
RBC # BLD: 4.35 E12/L (ref 3.5–5.5)
SODIUM BLD-SCNC: 143 MMOL/L (ref 132–146)
TOTAL PROTEIN: 7.2 G/DL (ref 6.4–8.3)
TRIGL SERPL-MCNC: 32 MG/DL (ref 0–149)
TSH SERPL DL<=0.05 MIU/L-ACNC: 2.58 UIU/ML (ref 0.27–4.2)
VITAMIN D 25-HYDROXY: 56 NG/ML (ref 30–100)
VLDLC SERPL CALC-MCNC: 6 MG/DL
WBC # BLD: 4 E9/L (ref 4.5–11.5)

## 2022-08-01 ENCOUNTER — OFFICE VISIT (OUTPATIENT)
Dept: PRIMARY CARE CLINIC | Age: 62
End: 2022-08-01
Payer: COMMERCIAL

## 2022-08-01 VITALS
TEMPERATURE: 97.9 F | OXYGEN SATURATION: 96 % | DIASTOLIC BLOOD PRESSURE: 76 MMHG | BODY MASS INDEX: 21.13 KG/M2 | HEART RATE: 62 BPM | WEIGHT: 139 LBS | SYSTOLIC BLOOD PRESSURE: 110 MMHG

## 2022-08-01 DIAGNOSIS — Z00.00 ENCOUNTER FOR WELL ADULT EXAM WITHOUT ABNORMAL FINDINGS: Primary | ICD-10-CM

## 2022-08-01 DIAGNOSIS — R53.83 FATIGUE, UNSPECIFIED TYPE: ICD-10-CM

## 2022-08-01 DIAGNOSIS — N95.2 VAGINAL ATROPHY: ICD-10-CM

## 2022-08-01 DIAGNOSIS — E78.2 MIXED HYPERLIPIDEMIA: ICD-10-CM

## 2022-08-01 DIAGNOSIS — Z12.11 SCREEN FOR COLON CANCER: ICD-10-CM

## 2022-08-01 DIAGNOSIS — M85.859 OSTEOPENIA OF HIP, UNSPECIFIED LATERALITY: ICD-10-CM

## 2022-08-01 DIAGNOSIS — E55.9 VITAMIN D DEFICIENCY: ICD-10-CM

## 2022-08-01 DIAGNOSIS — N39.0 RECURRENT UTI: ICD-10-CM

## 2022-08-01 PROCEDURE — 99396 PREV VISIT EST AGE 40-64: CPT | Performed by: FAMILY MEDICINE

## 2022-08-01 ASSESSMENT — ENCOUNTER SYMPTOMS
BACK PAIN: 0
EYE REDNESS: 0
RHINORRHEA: 0
PHOTOPHOBIA: 0
SHORTNESS OF BREATH: 0
NAUSEA: 0
VOMITING: 0
CONSTIPATION: 0
COUGH: 0
SORE THROAT: 0
ABDOMINAL PAIN: 0
WHEEZING: 0
DIARRHEA: 0
BLOOD IN STOOL: 0

## 2022-08-01 ASSESSMENT — PATIENT HEALTH QUESTIONNAIRE - PHQ9
SUM OF ALL RESPONSES TO PHQ9 QUESTIONS 1 & 2: 0
1. LITTLE INTEREST OR PLEASURE IN DOING THINGS: 0
SUM OF ALL RESPONSES TO PHQ QUESTIONS 1-9: 0
SUM OF ALL RESPONSES TO PHQ QUESTIONS 1-9: 0
2. FEELING DOWN, DEPRESSED OR HOPELESS: 0
SUM OF ALL RESPONSES TO PHQ QUESTIONS 1-9: 0
SUM OF ALL RESPONSES TO PHQ QUESTIONS 1-9: 0

## 2022-08-01 NOTE — PATIENT INSTRUCTIONS
Well Visit, Women 48 to 72: Care Instructions  Overview     Well visits can help you stay healthy. Your doctor has checked your overall health and may have suggested ways to take good care of yourself. Your doctor also may have recommended tests. At home, you can help prevent illness withhealthy eating, regular exercise, and other steps. Follow-up care is a key part of your treatment and safety. Be sure to make and go to all appointments, and call your doctor if you are having problems. It's also a good idea to know your test results and keep alist of the medicines you take. How can you care for yourself at home? Get screening tests that you and your doctor decide on. Screening helps find diseases before any symptoms appear. Eat healthy foods. Choose fruits, vegetables, whole grains, protein, and low-fat dairy foods. Limit fat, especially saturated fat. Reduce salt in your diet. Limit alcohol. Have no more than 1 drink a day or 7 drinks a week. Get at least 30 minutes of exercise on most days of the week. Walking is a good choice. You also may want to do other activities, such as running, swimming, cycling, or playing tennis or team sports. Reach and stay at a healthy weight. This will lower your risk for many problems, such as obesity, diabetes, heart disease, and high blood pressure. Do not smoke. Smoking can make health problems worse. If you need help quitting, talk to your doctor about stop-smoking programs and medicines. These can increase your chances of quitting for good. Care for your mental health. It is easy to get weighed down by worry and stress. Learn strategies to manage stress, like deep breathing and mindfulness, and stay connected with your family and community. If you find you often feel sad or hopeless, talk with your doctor. Treatment can help. Talk to your doctor about whether you have any risk factors for sexually transmitted infections (STIs).  You can help prevent STIs if you wait to have sex with a new partner (or partners) until you've each been tested for STIs. It also helps if you use condoms (male or female condoms) and if you limit your sex partners to one person who only has sex with you. Vaccines are available for some STIs. If you think you may have a problem with alcohol or drug use, talk to your doctor. This includes prescription medicines (such as amphetamines and opioids) and illegal drugs (such as cocaine and methamphetamine). Your doctor can help you figure out what type of treatment is best for you. Protect your skin from too much sun. When you're outdoors from 10 a.m. to 4 p.m., stay in the shade or cover up with clothing and a hat with a wide brim. Wear sunglasses that block UV rays. Even when it's cloudy, put broad-spectrum sunscreen (SPF 30 or higher) on any exposed skin. See a dentist one or two times a year for checkups and to have your teeth cleaned. Wear a seat belt in the car. When should you call for help? Watch closely for changes in your health, and be sure to contact your doctor if you have any problems or symptoms that concern you. Where can you learn more? Go to https://PipettepeOOHLALA Mobileeb.health-partners. org and sign in to your [employer] account. Enter K108 in the [facility] box to learn more about \"Well Visit, Women 50 to 72: Care Instructions. \"     If you do not have an account, please click on the \"Sign Up Now\" link. Current as of: October 6, 2021               Content Version: 13.3  © 2006-2022 Healthwise, Incorporated. Care instructions adapted under license by [facility] ([facility]). If you have questions about a medical condition or this instruction, always ask your healthcare professional. [place] [address] any warranty or liability for your use of this information.

## 2022-08-01 NOTE — PROGRESS NOTES
Negative for polydipsia and polyuria. Genitourinary:  Negative for difficulty urinating, dysuria and hematuria. Musculoskeletal:  Negative for arthralgias and back pain. Skin:  Negative for rash. Neurological:  Negative for dizziness, weakness, light-headedness, numbness and headaches. Psychiatric/Behavioral:  The patient is not nervous/anxious. Prior to Visit Medications    Medication Sig Taking? Authorizing Provider   Lactobacillus (FLORAJEN ACIDOPHILUS PO) Take by mouth daily  Yes Historical Provider, MD   Cholecalciferol (VITAMIN D) 50 MCG (2000 UT) CAPS capsule Take by mouth Yes Historical Provider, MD   Calcium Carb-Cholecalciferol (CALCIUM+D3) 600-800 MG-UNIT TABS Take by mouth Yes Historical Provider, MD   Multiple Vitamin (MULTI-VITAMIN PO) Take by mouth daily  Yes Historical Provider, MD        No Known Allergies    No past medical history on file. Menstrual History:  OB History          2    Para   2    Term                AB        Living             SAB        IAB        Ectopic        Molar        Multiple        Live Births                     No LMP recorded. Patient is postmenopausal.         Past Surgical History:   Procedure Laterality Date    BREAST BIOPSY         No family history on file.     Immunization History   Administered Date(s) Administered    COVID-19, MODERNA BLUE border, Primary or Immunocompromised, (age 12y+), IM, 100 mcg/0.5mL 2020, 2021    Influenza, Quadv, IM, PF (6 mo and older Fluzone, Flulaval, Fluarix, and 3 yrs and older Afluria) 2020    Tdap (Boostrix, Adacel) 2020    Zoster Recombinant (Shingrix) 2020       Health Maintenance   Topic Date Due    Colorectal Cancer Screen  Never done    COVID-19 Vaccine (3 - Booster for Moderna series) 2021    Depression Screen  2022    Flu vaccine (1) 2022    Breast cancer screen  2023    Cervical cancer screen  2023    Lipids  07/15/2027 DTaP/Tdap/Td vaccine (2 - Td or Tdap) 09/02/2030    Shingles vaccine  Completed    Hepatitis A vaccine  Aged Out    Hepatitis B vaccine  Aged Out    Hib vaccine  Aged Out    Meningococcal (ACWY) vaccine  Aged Out    Pneumococcal 0-64 years Vaccine  Aged Out    Hepatitis C screen  Discontinued    HIV screen  Discontinued       Social History     Socioeconomic History    Marital status:      Spouse name: Not on file    Number of children: Not on file    Years of education: Not on file    Highest education level: Not on file   Occupational History    Not on file   Tobacco Use    Smoking status: Never    Smokeless tobacco: Never   Substance and Sexual Activity    Alcohol use: Never    Drug use: Never    Sexual activity: Not on file   Other Topics Concern    Not on file   Social History Narrative    Not on file     Social Determinants of Health     Financial Resource Strain: Low Risk     Difficulty of Paying Living Expenses: Not hard at all   Food Insecurity: No Food Insecurity    Worried About Running Out of Food in the Last Year: Never true    Ran Out of Food in the Last Year: Never true   Transportation Needs: Not on file   Physical Activity: Not on file   Stress: Not on file   Social Connections: Not on file   Intimate Partner Violence: Not on file   Housing Stability: Not on file        Vitals:    08/01/22 1016   BP: 110/76   Pulse: 62   Temp: 97.9 °F (36.6 °C)   SpO2: 96%   Weight: 139 lb (63 kg)       Estimated body mass index is 21.13 kg/m² as calculated from the following:    Height as of 2/11/22: 5' 8\" (1.727 m). Weight as of this encounter: 139 lb (63 kg). Physical Exam  Vitals and nursing note reviewed. Constitutional:       Appearance: She is well-developed. HENT:      Head: Normocephalic. Right Ear: Tympanic membrane and external ear normal.      Left Ear: Tympanic membrane and external ear normal.   Eyes:      Extraocular Movements: Extraocular movements intact. Conjunctiva/sclera: Conjunctivae normal.      Pupils: Pupils are equal, round, and reactive to light. Neck:      Trachea: Trachea normal.   Cardiovascular:      Rate and Rhythm: Normal rate and regular rhythm. Pulses:           Radial pulses are 2+ on the right side and 2+ on the left side. Posterior tibial pulses are 2+ on the right side and 2+ on the left side. Heart sounds: Normal heart sounds. No murmur heard. Pulmonary:      Effort: Pulmonary effort is normal. No respiratory distress. Breath sounds: Normal breath sounds. No decreased breath sounds, wheezing or rales. Abdominal:      General: Bowel sounds are normal. There is no distension. Palpations: Abdomen is soft. Tenderness: There is no abdominal tenderness. There is no rebound. Musculoskeletal:         General: Normal range of motion. Cervical back: Neck supple. Right lower leg: No edema. Left lower leg: No edema. Skin:     General: Skin is warm and dry. Findings: No rash. Neurological:      Mental Status: She is alert and oriented to person, place, and time. Cranial Nerves: No cranial nerve deficit. Sensory: No sensory deficit. Deep Tendon Reflexes:      Reflex Scores:       Patellar reflexes are 2+ on the right side and 2+ on the left side. Psychiatric:         Mood and Affect: Mood normal.         Behavior: Behavior normal.       Patient Active Problem List    Diagnosis Date Noted    Swollen eyelid, left 09/02/2020    Recurrent UTI 09/02/2020    Vaginal atrophy 09/02/2020    Swollen eyelid, right 09/02/2020         ASSESSMENT/PLAN:    Labs: CBC essentially within normal limits. CMP within normal limits. Lipid panel with a total cholesterol 221. Triglycerides 32. HDL 90. . TSH 2.5. Vitamin D 56. Esthersamlink Maxwell was seen today for annual exam, discuss labs and other.     Diagnoses and all orders for this visit:    Encounter for well adult exam without abnormal findings  Patient for recommendations reviewed and advised. Patient is essentially up-to-date on all of her health maintenance issues. Additional orders as noted below. Screen for colon cancer  -     Fecal DNA Colorectal cancer screening (Cologuard)  Normal colonoscopy 10 years ago approximately. Due for update. Will perform Cologuard this time around. Patient will need follow-up colonoscopy or Cologuard testing in 3 years as long as her pending Cologuard is negative. Mixed hyperlipidemia  -     Miscellaneous Sendout; Future  -     CBC with Auto Differential; Future  -     Comprehensive Metabolic Panel; Future  Lifestyle modifications advisable. Patient overall does have a very healthy lifestyle. Advised on increasing exercise and weight training. Patient to continue flaxseed and [place] nuts. Patient also uses/eats [place] seeds daily. Add on citrus bergamot supplement. Vitamin D deficiency  -     Vitamin D 25 Hydroxy; Future  Continue current vitamin D and vitamin K. Osteopenia of hip, unspecified laterality  Repeat DEXA pending. Patient is on vitamin D and vitamin K. Hopefully the patient's bone density is stabilized. Vaginal atrophy  Patient discontinued topical hormone cream due to breast tenderness issues. Patient is not currently on anything for this issue. Following with OB/GYN. Fatigue, unspecified type  -     TSH; Future  Patient reports some muscle cramping and weakness at times. Patient advised on weight training. Patient also may take 200 mg of magnesium at night with 100 mg of co-Q10. Recurrent UTI  Decreased frequency using probiotic and d-mannose. Continue to follow. Health Maintenance reviewed    Labs prior to next appointment. We will check advanced lipid panel prior to next appointment. ASCVD risk is less than 10%.     Return in 1 year (on 8/1/2023) for Physical.      Educational materials and/or home exercises printed for patient's review and were included in

## 2022-08-17 LAB — NONINV COLON CA DNA+OCC BLD SCRN STL QL: NEGATIVE

## 2022-10-17 DIAGNOSIS — Z12.31 BREAST CANCER SCREENING BY MAMMOGRAM: ICD-10-CM

## 2022-12-19 DIAGNOSIS — Z01.84 IMMUNITY STATUS TESTING: Primary | ICD-10-CM

## 2022-12-28 ENCOUNTER — OFFICE VISIT (OUTPATIENT)
Dept: FAMILY MEDICINE CLINIC | Age: 62
End: 2022-12-28
Payer: COMMERCIAL

## 2022-12-28 VITALS
TEMPERATURE: 97.8 F | OXYGEN SATURATION: 98 % | WEIGHT: 142.6 LBS | BODY MASS INDEX: 21.68 KG/M2 | HEART RATE: 68 BPM | DIASTOLIC BLOOD PRESSURE: 80 MMHG | SYSTOLIC BLOOD PRESSURE: 132 MMHG

## 2022-12-28 DIAGNOSIS — J01.90 ACUTE NON-RECURRENT SINUSITIS, UNSPECIFIED LOCATION: ICD-10-CM

## 2022-12-28 DIAGNOSIS — J06.9 ACUTE UPPER RESPIRATORY INFECTION, UNSPECIFIED: ICD-10-CM

## 2022-12-28 DIAGNOSIS — R09.82 POSTNASAL DRIP: ICD-10-CM

## 2022-12-28 DIAGNOSIS — R09.81 NASAL CONGESTION: ICD-10-CM

## 2022-12-28 DIAGNOSIS — R05.9 COUGH, UNSPECIFIED TYPE: Primary | ICD-10-CM

## 2022-12-28 DIAGNOSIS — J04.0 ACUTE LARYNGITIS: ICD-10-CM

## 2022-12-28 LAB
INFLUENZA A ANTIBODY: NEGATIVE
INFLUENZA B ANTIBODY: NEGATIVE

## 2022-12-28 PROCEDURE — 87804 INFLUENZA ASSAY W/OPTIC: CPT | Performed by: PHYSICIAN ASSISTANT

## 2022-12-28 PROCEDURE — 99213 OFFICE O/P EST LOW 20 MIN: CPT | Performed by: PHYSICIAN ASSISTANT

## 2022-12-28 RX ORDER — CHLORPHENIRAMINE MALEATE 4 MG/1
4 TABLET ORAL EVERY 6 HOURS PRN
Qty: 20 TABLET | Refills: 0 | Status: SHIPPED | OUTPATIENT
Start: 2022-12-28

## 2022-12-28 RX ORDER — DOXYCYCLINE HYCLATE 100 MG
100 TABLET ORAL 2 TIMES DAILY
Qty: 20 TABLET | Refills: 0 | Status: SHIPPED | OUTPATIENT
Start: 2022-12-28 | End: 2023-01-07

## 2022-12-28 RX ORDER — BENZONATATE 100 MG/1
100 CAPSULE ORAL 3 TIMES DAILY PRN
Qty: 21 CAPSULE | Refills: 0 | Status: SHIPPED | OUTPATIENT
Start: 2022-12-28 | End: 2023-01-04

## 2022-12-28 RX ORDER — PREDNISONE 10 MG/1
TABLET ORAL
Qty: 18 TABLET | Refills: 0 | Status: SHIPPED | OUTPATIENT
Start: 2022-12-28

## 2022-12-28 NOTE — PROGRESS NOTES
22  Dajuan Pete : 1960 Sex: female  Age 60 y.o. Subjective:  Chief Complaint   Patient presents with    Cough     Started 6 days ago    Headache    Fatigue         HPI:   Dajuan Pete , 60 y.o. female presents to express care for evaluation of cough, congestion, drainage, headache and fatigue    HPI  58-year-old female presents to express care for evaluation of a cough, congestion, drainage, fatigue and headache. The patient has had the symptoms ongoing for about 6 days. Started off more with more the congestion drainage initially. The patient states that she has now started to lose her voice. No fevers. The patient is not really taking anything over-the-counter. ROS:   Unless otherwise stated in this report the patient's positive and negative responses for review of systems for constitutional, eyes, ENT, cardiovascular, respiratory, gastrointestinal, neurological, , musculoskeletal, and integument systems and related systems to the presenting problem are either stated in the history of present illness or were not pertinent or were negative for the symptoms and/or complaints related to the presenting medical problem. Positives and pertinent negatives as per HPI. All others reviewed and are negative. PMH:   History reviewed. No pertinent past medical history. Past Surgical History:   Procedure Laterality Date    BREAST BIOPSY         History reviewed. No pertinent family history.     Medications:     Current Outpatient Medications:     predniSONE (DELTASONE) 10 MG tablet, 3 tabs once daily for 3 days, 2 tabs once daily for 3 days, 1 tab once daily for 3 days, Disp: 18 tablet, Rfl: 0    doxycycline hyclate (VIBRA-TABS) 100 MG tablet, Take 1 tablet by mouth 2 times daily for 10 days, Disp: 20 tablet, Rfl: 0    benzonatate (TESSALON) 100 MG capsule, Take 1 capsule by mouth 3 times daily as needed for Cough, Disp: 21 capsule, Rfl: 0    chlorpheniramine (ALLER-CHLOR) 4 MG tablet, Take 1 tablet by mouth every 6 hours as needed for Allergies, Disp: 20 tablet, Rfl: 0    Lactobacillus (FLORAJEN ACIDOPHILUS PO), Take by mouth daily , Disp: , Rfl:     Cholecalciferol (VITAMIN D) 50 MCG (2000 UT) CAPS capsule, Take by mouth, Disp: , Rfl:     Calcium Carb-Cholecalciferol (CALCIUM+D3) 600-800 MG-UNIT TABS, Take by mouth, Disp: , Rfl:     Multiple Vitamin (MULTI-VITAMIN PO), Take by mouth daily , Disp: , Rfl:     Allergies:   No Known Allergies    Social History:     Social History     Tobacco Use    Smoking status: Never    Smokeless tobacco: Never   Substance Use Topics    Alcohol use: Never    Drug use: Never       Patient lives at home. Physical Exam:     Vitals:    12/28/22 1521   BP: 132/80   Site: Right Upper Arm   Position: Sitting   Pulse: 68   Temp: 97.8 °F (36.6 °C)   TempSrc: Temporal   SpO2: 98%   Weight: 142 lb 9.6 oz (64.7 kg)       Exam:  Physical Exam  Nurse's notes and vital signs reviewed. The patient is not hypoxic. ? General: Alert, no acute distress, patient resting comfortably Patient is not toxic or lethargic. Skin: Warm, intact, no pallor noted. There is no evidence of rash at this time. Head: Normocephalic, atraumatic  Eye: Normal conjunctiva  Ears, Nose, Throat: Right tympanic membrane clear, left tympanic membrane clear. No drainage or discharge noted. No pre- or post-auricular tenderness, erythema, or swelling noted. Nasal congestion, rhinorrhea, no epistaxis, the patient does have a hoarse voice  Posterior oropharynx shows erythema and cobblestoning but no evidence of tonsillar hypertrophy, or exudate. the uvula is midline. No trismus or drooling is noted. Moist mucous membranes. Cardiovascular: Regular Rate and Rhythm  Respiratory: No acute distress, no rhonchi, wheezing or crackles noted. No stridor or retractions are noted.   Neurological: A&O x4, normal speech  Psychiatric: Cooperative       Testing:     Results for orders placed or performed in visit on 12/28/22   POCT Influenza A/B   Result Value Ref Range    Influenza A Ab negative     Influenza B Ab negative            Medical Decision Making:     Vital signs reviewed    Past medical history reviewed. Allergies reviewed. Medications reviewed. Patient on arrival does not appear to be in any apparent distress or discomfort. The patient has been seen and evaluated. The patient does not appear to be toxic or lethargic. The patient had flu test that was negative. Declined COVID testing. The patient will be treated with doxycycline, prednisone, chlorpheniramine and Tessalon Perles. The patient was educated on the proper dosage of motrin and tylenol and the appropriate intervals of each. The patient is to increase fluid intake over the next several days. The patient is to use OTC decongestant as needed. The patient is to return to express care or go directly to the emergency department should any of the signs or symptoms worsen. The patient is to followup with primary care physician in 2-3 days for repeat evaluation. The patient has no other questions or concerns at this time the patient will be discharged home. Clinical Impression:   Rodrigue Santana was seen today for cough, headache and fatigue. Diagnoses and all orders for this visit:    Cough, unspecified type  -     Cancel: POCT COVID-19, Antigen  -     POCT Influenza A/B    Acute upper respiratory infection, unspecified    Acute non-recurrent sinusitis, unspecified location    Postnasal drip    Nasal congestion    Acute laryngitis    Other orders  -     predniSONE (DELTASONE) 10 MG tablet; 3 tabs once daily for 3 days, 2 tabs once daily for 3 days, 1 tab once daily for 3 days  -     doxycycline hyclate (VIBRA-TABS) 100 MG tablet; Take 1 tablet by mouth 2 times daily for 10 days  -     benzonatate (TESSALON) 100 MG capsule;  Take 1 capsule by mouth 3 times daily as needed for Cough  -     chlorpheniramine (ALLER-CHLOR) 4 MG tablet; Take 1 tablet by mouth every 6 hours as needed for Allergies      The patient is to call for any concerns or return if any of the signs or symptoms worsen. The patient is to follow-up with PCP in the next 2-3 days for repeat evaluation repeat assessment or go directly to the emergency department.      SIGNATURE: Richardson Polo III, PA-C

## 2023-10-19 ENCOUNTER — OFFICE VISIT (OUTPATIENT)
Dept: FAMILY MEDICINE CLINIC | Age: 63
End: 2023-10-19
Payer: COMMERCIAL

## 2023-10-19 VITALS
OXYGEN SATURATION: 100 % | WEIGHT: 139.8 LBS | HEART RATE: 54 BPM | BODY MASS INDEX: 21.19 KG/M2 | DIASTOLIC BLOOD PRESSURE: 74 MMHG | TEMPERATURE: 97.8 F | HEIGHT: 68 IN | SYSTOLIC BLOOD PRESSURE: 116 MMHG

## 2023-10-19 DIAGNOSIS — M25.511 CHRONIC RIGHT SHOULDER PAIN: Primary | ICD-10-CM

## 2023-10-19 DIAGNOSIS — G89.29 CHRONIC RIGHT SHOULDER PAIN: Primary | ICD-10-CM

## 2023-10-19 DIAGNOSIS — M62.838 TRAPEZIUS MUSCLE SPASM: ICD-10-CM

## 2023-10-19 DIAGNOSIS — R91.1 LUNG NODULE, SOLITARY: ICD-10-CM

## 2023-10-19 PROCEDURE — 99213 OFFICE O/P EST LOW 20 MIN: CPT | Performed by: EMERGENCY MEDICINE

## 2023-10-19 RX ORDER — METHYLPREDNISOLONE 4 MG/1
TABLET ORAL
Qty: 1 KIT | Refills: 0 | Status: SHIPPED | OUTPATIENT
Start: 2023-10-19

## 2023-10-19 RX ORDER — TIZANIDINE 4 MG/1
4 TABLET ORAL NIGHTLY PRN
Qty: 10 TABLET | Refills: 0 | Status: SHIPPED | OUTPATIENT
Start: 2023-10-19

## 2023-10-19 ASSESSMENT — ENCOUNTER SYMPTOMS
EYE REDNESS: 0
VOMITING: 0
DIARRHEA: 0
SHORTNESS OF BREATH: 0
NAUSEA: 0
WHEEZING: 0
EYE DISCHARGE: 0
ABDOMINAL DISTENTION: 0
SORE THROAT: 0
BACK PAIN: 0
SINUS PRESSURE: 0
EYE PAIN: 0
COUGH: 0

## 2023-10-19 NOTE — PROGRESS NOTES
Chief Complaint:   Shoulder Pain (Pain in right shoulder starting 10/4/23)      History of Present Illness   HPI:  Bishop Enriquez is a 58 y.o. female who presents to [facility] today for R shoulder ongoing scapular pain, spasm and sensitivity to touch    Prior to Visit Medications    Medication Sig Taking? Authorizing Provider   tiZANidine (ZANAFLEX) 4 MG tablet Take 1 tablet by mouth nightly as needed (back spasm) Yes Abi Leyva DO   methylPREDNISolone (MEDROL, ALLEN,) 4 MG tablet Follow package instructions Yes Inga Goode DO   Lactobacillus (FLORAJEN ACIDOPHILUS PO) Take by mouth daily   ProviderBety MD   Cholecalciferol (VITAMIN D) 50 MCG (2000 UT) CAPS capsule Take by mouth  Bety Rajput MD   Calcium Carb-Cholecalciferol (CALCIUM+D3) 600-800 MG-UNIT TABS Take by mouth  Bety Rajput MD   Multiple Vitamin (MULTI-VITAMIN PO) Take by mouth daily   ProviderBety MD       Review of Systems   Review of Systems   Constitutional:  Positive for activity change. Negative for chills and fever. HENT:  Negative for ear pain, sinus pressure and sore throat. Eyes:  Negative for pain, discharge and redness. Respiratory:  Negative for cough, shortness of breath and wheezing. Cardiovascular:  Negative for chest pain. Gastrointestinal:  Negative for abdominal distention, diarrhea, nausea and vomiting. Genitourinary:  Negative for dysuria and frequency. Musculoskeletal:  Positive for arthralgias and myalgias. Negative for back pain. Skin:  Negative for rash and wound. Neurological:  Negative for weakness and headaches. Hematological:  Negative for adenopathy. Psychiatric/Behavioral: Negative. All other systems reviewed and are negative. Patient's medical, social, and family history reviewed    Past Medical History:  has no past medical history on file. Past Surgical History:  has a past surgical history that includes Breast biopsy.   Social

## 2023-10-20 ENCOUNTER — HOSPITAL ENCOUNTER (OUTPATIENT)
Dept: CT IMAGING | Age: 63
Discharge: HOME | End: 2023-10-20
Payer: COMMERCIAL

## 2023-10-20 ENCOUNTER — TELEPHONE (OUTPATIENT)
Dept: FAMILY MEDICINE CLINIC | Age: 63
End: 2023-10-20

## 2023-10-20 ENCOUNTER — HOSPITAL ENCOUNTER (OUTPATIENT)
Age: 63
End: 2023-10-20
Payer: COMMERCIAL

## 2023-10-20 DIAGNOSIS — M25.511 CHRONIC RIGHT SHOULDER PAIN: ICD-10-CM

## 2023-10-20 DIAGNOSIS — G89.29 CHRONIC RIGHT SHOULDER PAIN: ICD-10-CM

## 2023-10-20 DIAGNOSIS — R91.1 LUNG NODULE, SOLITARY: ICD-10-CM

## 2023-10-20 PROCEDURE — 71250 CT THORAX DX C-: CPT

## 2023-10-20 NOTE — TELEPHONE ENCOUNTER
Pt was advised that a STAT Ct scan was ordered and scheduling will be calling to set appt up for her. Pt voiced understating and will be awaiting call.

## 2023-10-20 NOTE — TELEPHONE ENCOUNTER
Pt came in to office this morning regarding results of her recent shoulder xray. Pt was advised of results via telephone on 10/19. Upon pt reviewing her results on my chart, she came into the office asking to speak with provider or nurse about the results because of a finding that she saw on the report.

## 2023-10-29 SDOH — HEALTH STABILITY: PHYSICAL HEALTH: ON AVERAGE, HOW MANY MINUTES DO YOU ENGAGE IN EXERCISE AT THIS LEVEL?: 40 MIN

## 2023-10-29 SDOH — HEALTH STABILITY: PHYSICAL HEALTH: ON AVERAGE, HOW MANY DAYS PER WEEK DO YOU ENGAGE IN MODERATE TO STRENUOUS EXERCISE (LIKE A BRISK WALK)?: 5 DAYS

## 2023-10-31 ENCOUNTER — OFFICE VISIT (OUTPATIENT)
Dept: PRIMARY CARE CLINIC | Age: 63
End: 2023-10-31
Payer: COMMERCIAL

## 2023-10-31 VITALS
DIASTOLIC BLOOD PRESSURE: 82 MMHG | OXYGEN SATURATION: 97 % | HEART RATE: 92 BPM | HEIGHT: 68 IN | BODY MASS INDEX: 20.31 KG/M2 | WEIGHT: 134 LBS | SYSTOLIC BLOOD PRESSURE: 124 MMHG

## 2023-10-31 DIAGNOSIS — E55.9 VITAMIN D DEFICIENCY: ICD-10-CM

## 2023-10-31 DIAGNOSIS — M53.9 MULTILEVEL DEGENERATIVE DISC DISEASE: ICD-10-CM

## 2023-10-31 DIAGNOSIS — M67.911 TENDINOPATHY OF RIGHT SHOULDER: ICD-10-CM

## 2023-10-31 DIAGNOSIS — N39.0 RECURRENT UTI: ICD-10-CM

## 2023-10-31 DIAGNOSIS — M89.8X1 PAIN OF RIGHT SCAPULA: ICD-10-CM

## 2023-10-31 DIAGNOSIS — M89.8X1 PAIN OF RIGHT SCAPULA: Primary | ICD-10-CM

## 2023-10-31 DIAGNOSIS — E78.2 MIXED HYPERLIPIDEMIA: ICD-10-CM

## 2023-10-31 LAB
ABSOLUTE IMMATURE GRANULOCYTE: <0.03 K/UL (ref 0–0.58)
ALBUMIN SERPL-MCNC: 4.5 G/DL (ref 3.5–5.2)
ALP BLD-CCNC: 67 U/L (ref 35–104)
ALT SERPL-CCNC: 11 U/L (ref 0–32)
ANION GAP SERPL CALCULATED.3IONS-SCNC: 17 MMOL/L (ref 7–16)
AST SERPL-CCNC: 20 U/L (ref 0–31)
BACTERIA: ABNORMAL
BASOPHILS ABSOLUTE: 0.08 K/UL (ref 0–0.2)
BASOPHILS RELATIVE PERCENT: 1 % (ref 0–2)
BILIRUB SERPL-MCNC: 0.3 MG/DL (ref 0–1.2)
BILIRUBIN DIRECT: <0.2 MG/DL (ref 0–0.3)
BILIRUBIN URINE: NEGATIVE
BILIRUBIN, INDIRECT: NORMAL MG/DL (ref 0–1)
BUN BLDV-MCNC: 16 MG/DL (ref 6–23)
C-REACTIVE PROTEIN: <3 MG/L (ref 0–5)
CALCIUM SERPL-MCNC: 10.1 MG/DL (ref 8.6–10.2)
CHLORIDE BLD-SCNC: 97 MMOL/L (ref 98–107)
CHOLESTEROL: 212 MG/DL
CO2: 22 MMOL/L (ref 22–29)
COLOR: YELLOW
CREAT SERPL-MCNC: 0.7 MG/DL (ref 0.5–1)
CREATININE URINE: 127.3 MG/DL (ref 29–226)
EOSINOPHILS ABSOLUTE: 0.37 K/UL (ref 0.05–0.5)
EOSINOPHILS RELATIVE PERCENT: 5 % (ref 0–6)
EPITHELIAL CELLS UA: ABNORMAL /HPF
FOLATE: >20 NG/ML (ref 4.8–24.2)
GFR SERPL CREATININE-BSD FRML MDRD: >60 ML/MIN/1.73M2
GLUCOSE BLD-MCNC: 82 MG/DL (ref 74–99)
GLUCOSE URINE: NEGATIVE MG/DL
HBA1C MFR BLD: 6 % (ref 4–5.6)
HCT VFR BLD CALC: 43 % (ref 34–48)
HDLC SERPL-MCNC: 83 MG/DL
HEMOGLOBIN: 13.9 G/DL (ref 11.5–15.5)
IMMATURE GRANULOCYTES: 0 % (ref 0–5)
KETONES, URINE: ABNORMAL MG/DL
LDL CHOLESTEROL: 118 MG/DL
LEUKOCYTE ESTERASE, URINE: NEGATIVE
LYMPHOCYTES ABSOLUTE: 2.8 K/UL (ref 1.5–4)
LYMPHOCYTES RELATIVE PERCENT: 37 % (ref 20–42)
MCH RBC QN AUTO: 30.7 PG (ref 26–35)
MCHC RBC AUTO-ENTMCNC: 32.3 G/DL (ref 32–34.5)
MCV RBC AUTO: 94.9 FL (ref 80–99.9)
MICROALBUMIN/CREAT 24H UR: <12 MG/L (ref 0–19)
MICROALBUMIN/CREAT UR-RTO: NORMAL MCG/MG CREAT (ref 0–30)
MONOCYTES ABSOLUTE: 0.52 K/UL (ref 0.1–0.95)
MONOCYTES RELATIVE PERCENT: 7 % (ref 2–12)
MUCUS: PRESENT
NEUTROPHILS ABSOLUTE: 3.89 K/UL (ref 1.8–7.3)
NEUTROPHILS RELATIVE PERCENT: 51 % (ref 43–80)
NITRITE, URINE: NEGATIVE
PDW BLD-RTO: 13.6 % (ref 11.5–15)
PH UA: 6 (ref 5–9)
PLATELET # BLD: 386 K/UL (ref 130–450)
PMV BLD AUTO: 10 FL (ref 7–12)
POTASSIUM SERPL-SCNC: 4.1 MMOL/L (ref 3.5–5)
PROTEIN UA: NEGATIVE MG/DL
RBC # BLD: 4.53 M/UL (ref 3.5–5.5)
RBC UA: ABNORMAL /HPF
SODIUM BLD-SCNC: 136 MMOL/L (ref 132–146)
SPECIFIC GRAVITY UA: 1.02 (ref 1–1.03)
T4 FREE: 1.3 NG/DL (ref 0.9–1.7)
TOTAL CK: 52 U/L (ref 20–180)
TOTAL PROTEIN: 7.4 G/DL (ref 6.4–8.3)
TRIGL SERPL-MCNC: 55 MG/DL
TSH SERPL DL<=0.05 MIU/L-ACNC: 3.74 UIU/ML (ref 0.27–4.2)
TURBIDITY: CLEAR
URIC ACID: 5.9 MG/DL (ref 2.4–5.7)
URINE HGB: NEGATIVE
UROBILINOGEN, URINE: 0.2 EU/DL (ref 0–1)
VITAMIN B-12: 982 PG/ML (ref 211–946)
VITAMIN D 25-HYDROXY: 44.7 NG/ML (ref 30–100)
VLDLC SERPL CALC-MCNC: 11 MG/DL
WBC # BLD: 7.7 K/UL (ref 4.5–11.5)
WBC UA: ABNORMAL /HPF

## 2023-10-31 PROCEDURE — 99214 OFFICE O/P EST MOD 30 MIN: CPT | Performed by: FAMILY MEDICINE

## 2023-10-31 RX ORDER — NAPROXEN SODIUM 220 MG
220 TABLET ORAL 2 TIMES DAILY WITH MEALS
COMMUNITY

## 2023-10-31 ASSESSMENT — ENCOUNTER SYMPTOMS
BLOOD IN STOOL: 0
SHORTNESS OF BREATH: 0
ABDOMINAL PAIN: 0
BACK PAIN: 1
WHEEZING: 0
VOMITING: 0
NAUSEA: 0
SORE THROAT: 0
DIARRHEA: 0
PHOTOPHOBIA: 0
EYE REDNESS: 0
RHINORRHEA: 0
CONSTIPATION: 0
COUGH: 0

## 2023-10-31 ASSESSMENT — PATIENT HEALTH QUESTIONNAIRE - PHQ9
1. LITTLE INTEREST OR PLEASURE IN DOING THINGS: 0
SUM OF ALL RESPONSES TO PHQ9 QUESTIONS 1 & 2: 0
2. FEELING DOWN, DEPRESSED OR HOPELESS: 0
SUM OF ALL RESPONSES TO PHQ QUESTIONS 1-9: 0

## 2023-10-31 NOTE — PROGRESS NOTES
for chest pain, palpitations and leg swelling. Gastrointestinal:  Negative for abdominal pain, blood in stool, constipation, diarrhea, nausea and vomiting. Endocrine: Negative for polydipsia and polyuria. Genitourinary:  Negative for difficulty urinating, dysuria and hematuria. Musculoskeletal:  Positive for arthralgias and back pain. Skin:  Negative for rash. Neurological:  Positive for weakness and numbness. Negative for dizziness, light-headedness and headaches. Psychiatric/Behavioral:  The patient is not nervous/anxious. All other systems reviewed and are negative. Current Outpatient Medications:     Naproxen Sod-diphenhydrAMINE (ALEVE PM PO), Take by mouth, Disp: , Rfl:     naproxen sodium (ANAPROX) 220 MG tablet, Take 1 tablet by mouth 2 times daily (with meals), Disp: , Rfl:     Lactobacillus (FLORAJEN ACIDOPHILUS PO), Take by mouth daily , Disp: , Rfl:     Cholecalciferol (VITAMIN D) 50 MCG (2000 UT) CAPS capsule, Take by mouth, Disp: , Rfl:     Calcium Carb-Cholecalciferol (CALCIUM+D3) 600-800 MG-UNIT TABS, Take by mouth, Disp: , Rfl:     Multiple Vitamin (MULTI-VITAMIN PO), Take by mouth daily , Disp: , Rfl:    Patient Active Problem List   Diagnosis    Swollen eyelid, left    Recurrent UTI    Vaginal atrophy    Swollen eyelid, right    Pain of right scapula     History reviewed. No pertinent past medical history.   Past Surgical History:   Procedure Laterality Date    BREAST BIOPSY       Social History     Socioeconomic History    Marital status:      Spouse name: Not on file    Number of children: Not on file    Years of education: Not on file    Highest education level: Not on file   Occupational History    Not on file   Tobacco Use    Smoking status: Never    Smokeless tobacco: Never   Substance and Sexual Activity    Alcohol use: Never    Drug use: Never    Sexual activity: Yes     Partners: Male   Other Topics Concern    Not on file   Social History Narrative    Not on

## 2023-11-01 LAB — ANTI-NUCLEAR ANTIBODY (ANA): NEGATIVE

## 2023-11-02 LAB
CULTURE: NORMAL
SPECIMEN DESCRIPTION: NORMAL

## 2023-11-03 LAB — HLA B27: NEGATIVE

## 2023-11-08 ENCOUNTER — OFFICE VISIT (OUTPATIENT)
Dept: PHYSICAL MEDICINE AND REHAB | Age: 63
End: 2023-11-08
Payer: COMMERCIAL

## 2023-11-08 VITALS
BODY MASS INDEX: 21.05 KG/M2 | DIASTOLIC BLOOD PRESSURE: 97 MMHG | HEIGHT: 68 IN | SYSTOLIC BLOOD PRESSURE: 154 MMHG | HEART RATE: 79 BPM | WEIGHT: 138.89 LBS

## 2023-11-08 DIAGNOSIS — M79.2 NEUROPATHIC PAIN: ICD-10-CM

## 2023-11-08 DIAGNOSIS — M89.8X1 PAIN OF RIGHT SCAPULA: Primary | ICD-10-CM

## 2023-11-08 PROCEDURE — 99204 OFFICE O/P NEW MOD 45 MIN: CPT | Performed by: STUDENT IN AN ORGANIZED HEALTH CARE EDUCATION/TRAINING PROGRAM

## 2023-11-08 RX ORDER — GABAPENTIN 100 MG/1
CAPSULE ORAL
Qty: 63 CAPSULE | Refills: 0 | Status: SHIPPED | OUTPATIENT
Start: 2023-11-08 | End: 2023-12-06

## 2023-11-08 NOTE — PATIENT INSTRUCTIONS
- Cervical and thoracic spine XR today - we will call you with results in about a week  - Gabapentin prescription ordered today - Take 1 at bedtime for 7 days, then twice a day for 7 days, then three times per day from there on  - Physical Therapy to start  - If not better of getting worse in 2-4 weeks, call and we will get MRI

## 2023-11-10 ENCOUNTER — TELEPHONE (OUTPATIENT)
Dept: PHYSICAL MEDICINE AND REHAB | Age: 63
End: 2023-11-10

## 2023-11-10 NOTE — TELEPHONE ENCOUNTER
Notified patient her Gabapentin was approved by  her insurance. And pharmacy was notified. Pt verbally understood.

## 2023-11-28 ENCOUNTER — HOSPITAL ENCOUNTER (OUTPATIENT)
Dept: PHYSICAL THERAPY | Age: 63
Setting detail: THERAPIES SERIES
Discharge: HOME | End: 2023-11-28
Attending: STUDENT IN AN ORGANIZED HEALTH CARE EDUCATION/TRAINING PROGRAM
Payer: COMMERCIAL

## 2023-11-28 PROCEDURE — 97161 PT EVAL LOW COMPLEX 20 MIN: CPT

## 2023-11-28 PROCEDURE — G0283 ELEC STIM OTHER THAN WOUND: HCPCS

## 2023-11-30 ENCOUNTER — HOSPITAL ENCOUNTER (OUTPATIENT)
Dept: PHYSICAL THERAPY | Age: 63
Setting detail: THERAPIES SERIES
Discharge: HOME | End: 2023-11-30
Attending: STUDENT IN AN ORGANIZED HEALTH CARE EDUCATION/TRAINING PROGRAM
Payer: COMMERCIAL

## 2023-11-30 PROCEDURE — 97110 THERAPEUTIC EXERCISES: CPT

## 2023-11-30 RX ORDER — GABAPENTIN 100 MG/1
100 CAPSULE ORAL 3 TIMES DAILY
Qty: 90 CAPSULE | Refills: 0 | Status: SHIPPED | OUTPATIENT
Start: 2023-11-30 | End: 2023-12-30

## 2023-11-30 NOTE — PROGRESS NOTES
[facility]  Phone: [phone] Fax: [phone]       Physical Therapy Daily Treatment Note  Date:  2023    Patient Name:  Travis Webb    :  1960  MRN: [id number]    Restrictions/Precautions:    Diagnosis:    Treatment Diagnosis:    Insurance/Certification information:    Referring Physician:    Plan of care signed (Y/N):    Visit# / total visits:  /  Pain level: /10  Time In:        Time Out:          Subjective:      Exercises:  Exercise/Equipment Resistance/Repetitions Other comments                                                                                                                                             Other Therapeutic Activities:      Home Exercise Program:      Manual Treatments:      Modalities:      Timed Code Treatment Minutes:       Total Treatment Minutes:      Treatment/Activity Tolerance:  [] Patient tolerated treatment well [] Patient limited by fatigue  [] Patient limited by pain  [] Patient limited by other medical complications  [] Other:     Plan:   [] Continue per plan of care [] Alter current plan (see comments)  [] Plan of care initiated [] Hold pending MD visit [] Discharge  Plan for Next Session:         Treatment Charges: Mins Units   Initial Evaluation     Re-Evaluation     Ther Exercise         TE     Manual Therapy     MT     Ther Activities        TA     Gait Training          GT     Neuro Re-education NR     Modalities     Non-Billable Service Time     Other     Total Time/Units *** ***     Electronically signed by:  Stacy Lucas PT

## 2023-12-05 ENCOUNTER — HOSPITAL ENCOUNTER (OUTPATIENT)
Dept: PHYSICAL THERAPY | Age: 63
Setting detail: THERAPIES SERIES
Discharge: HOME | End: 2023-12-05
Attending: STUDENT IN AN ORGANIZED HEALTH CARE EDUCATION/TRAINING PROGRAM
Payer: COMMERCIAL

## 2023-12-05 PROCEDURE — G0283 ELEC STIM OTHER THAN WOUND: HCPCS

## 2023-12-05 PROCEDURE — 97110 THERAPEUTIC EXERCISES: CPT

## 2023-12-07 ENCOUNTER — HOSPITAL ENCOUNTER (OUTPATIENT)
Dept: PHYSICAL THERAPY | Age: 63
Setting detail: THERAPIES SERIES
Discharge: HOME | End: 2023-12-07
Attending: STUDENT IN AN ORGANIZED HEALTH CARE EDUCATION/TRAINING PROGRAM
Payer: COMMERCIAL

## 2023-12-07 PROCEDURE — G0283 ELEC STIM OTHER THAN WOUND: HCPCS

## 2023-12-07 NOTE — PROGRESS NOTES
[facility]  Phone: [phone] Fax: [phone]       Physical Therapy Daily Treatment Note  Date:  2023    Patient Name:  Callum Lares    :  1960  MRN: [id number]    Restrictions/Precautions:    Diagnosis:  Right Shoulder/Scapular Pain   Treatment Diagnosis:    Insurance/Certification information:  BC/BS   Referring Physician:  Dr Rosario Bradlye of care signed (Y/N):    Visit# / total visits:  4  Pain level: /10  Time In:  710     Time Out: 740     Subjective:  PAtient reports there was an increase in general pain intensity since last PT session     Exercises:  Exercise/Equipment Resistance/Repetitions Other comments     UBE  8 min  nt           Seated Flex with Ball   10 reps  nt     Wall leandra self str 10 reps  nt                                                                                                                 Other Therapeutic Activities:      Home Exercise Program:      Manual Treatments:      Modalities:  Estim Right Shoulder/scapular region 20 min     Timed Code Treatment Minutes:  15    Total Treatment Minutes:  30    Treatment/Activity Tolerance:  [x] Patient tolerated treatment well [] Patient limited by fatigue  [] Patient limited by pain  [] Patient limited by other medical complications  [] Other:     Plan:   [] Continue per plan of care [] Alter current plan (see comments)  [] Plan of care initiated [x] Hold pending MD visit [] Discharge  Plan for Next Session:         Treatment Charges: Mins Units   Initial Evaluation     Re-Evaluation     Ther Exercise         TE     Manual Therapy     MT     Ther Activities        TA     Gait Training          GT     Neuro Re-education NR     Modalities 20 1   Non-Billable Service Time 10    Other     Total Time/Units 30 1     Electronically signed by:  [place], [address] [facility]

## 2023-12-08 ENCOUNTER — TELEPHONE (OUTPATIENT)
Dept: PRIMARY CARE CLINIC | Age: 63
End: 2023-12-08

## 2023-12-08 ENCOUNTER — OFFICE VISIT (OUTPATIENT)
Dept: FAMILY MEDICINE CLINIC | Age: 63
End: 2023-12-08
Payer: COMMERCIAL

## 2023-12-08 VITALS
TEMPERATURE: 97.4 F | BODY MASS INDEX: 20.31 KG/M2 | SYSTOLIC BLOOD PRESSURE: 116 MMHG | DIASTOLIC BLOOD PRESSURE: 76 MMHG | HEART RATE: 79 BPM | OXYGEN SATURATION: 96 % | WEIGHT: 133.6 LBS

## 2023-12-08 DIAGNOSIS — R05.1 ACUTE COUGH: Primary | ICD-10-CM

## 2023-12-08 PROCEDURE — 99213 OFFICE O/P EST LOW 20 MIN: CPT | Performed by: FAMILY MEDICINE

## 2023-12-08 RX ORDER — PREDNISONE 10 MG/1
TABLET ORAL
Qty: 30 TABLET | Refills: 0 | Status: SHIPPED | OUTPATIENT
Start: 2023-12-08

## 2023-12-08 RX ORDER — CODEINE PHOSPHATE/GUAIFENESIN 10-100MG/5
5 LIQUID (ML) ORAL 4 TIMES DAILY PRN
Qty: 237 ML | Refills: 0 | Status: SHIPPED | OUTPATIENT
Start: 2023-12-08 | End: 2023-12-20

## 2023-12-08 RX ORDER — AZITHROMYCIN 250 MG/1
250 TABLET, FILM COATED ORAL SEE ADMIN INSTRUCTIONS
Qty: 6 TABLET | Refills: 0 | Status: SHIPPED | OUTPATIENT
Start: 2023-12-08 | End: 2023-12-13

## 2023-12-08 ASSESSMENT — ENCOUNTER SYMPTOMS
TROUBLE SWALLOWING: 0
COUGH: 1
SORE THROAT: 1
GASTROINTESTINAL NEGATIVE: 1
EYES NEGATIVE: 1
SINUS PRESSURE: 1

## 2023-12-08 NOTE — TELEPHONE ENCOUNTER
Patient calling with symptoms of cough and congestion for 2 weeks. Concerned about bronchitis. Advised express as there were no available appointments.

## 2023-12-08 NOTE — PROGRESS NOTES
Mauri Gonzalez (:  1960) is a 58 y.o. female,Established patient, here for evaluation of the following chief complaint(s):  Cough (Started 3-4 weeks ago, declines testing), Drainage, and Headache         ASSESSMENT/PLAN:  1. Acute cough  -     azithromycin (ZITHROMAX) 250 MG tablet; Take 1 tablet by mouth See Admin Instructions for 5 days 500mg on day 1 followed by 250mg on days 2 - 5, Disp-6 tablet, R-0Normal  -     predniSONE (DELTASONE) 10 MG tablet; Take 4 tabs x 3 days, 3 tabs x 3 days, 2 tabs x 3 days, 1 tab x 3 days, stop., Disp-30 tablet, R-0Normal  -     guaiFENesin-codeine (TUSSI-ORGANIDIN NR) 100-10 MG/5ML syrup; Take 5 mLs by mouth 4 times daily as needed for Cough for up to 12 days. Max Daily Amount: 20 mLs, Disp-237 mL, R-0Normal    At this time we will treat symptomatically. Follow-up in 1 to 2 weeks if no improvement. Patient voiced understanding. No follow-ups on file. Subjective   SUBJECTIVE/OBJECTIVE:  Cough  Associated symptoms include headaches, postnasal drip and a sore throat. Pertinent negatives include no fever or rash. Headache    Patient presents today for 3 to 4 weeks of worsening congestion, headache mildly productive cough. No fever or chills. No chest pain or shortness of breath. No nausea vomiting or diarrhea. No known sick contact or recent travel however she does work at a hospital.  Review of Systems   Constitutional:  Negative for fever. HENT:  Positive for congestion, postnasal drip, sinus pressure and sore throat. Negative for trouble swallowing. Eyes: Negative. Respiratory:  Positive for cough. Cardiovascular: Negative. Gastrointestinal: Negative. Musculoskeletal:  Negative for neck pain. Skin:  Negative for rash. Neurological:  Positive for headaches. Hematological:  Negative for adenopathy. All other systems reviewed and are negative.          Current Outpatient Medications:     azithromycin (ZITHROMAX) 250 MG tablet, Take 1

## 2024-01-03 ENCOUNTER — OFFICE VISIT (OUTPATIENT)
Dept: PHYSICAL MEDICINE AND REHAB | Age: 64
End: 2024-01-03
Payer: COMMERCIAL

## 2024-01-03 VITALS
WEIGHT: 135 LBS | SYSTOLIC BLOOD PRESSURE: 132 MMHG | HEART RATE: 73 BPM | DIASTOLIC BLOOD PRESSURE: 87 MMHG | BODY MASS INDEX: 20.53 KG/M2

## 2024-01-03 DIAGNOSIS — M79.2 NEUROPATHIC PAIN: ICD-10-CM

## 2024-01-03 DIAGNOSIS — R20.2 LEFT HAND PARESTHESIA: ICD-10-CM

## 2024-01-03 DIAGNOSIS — M89.8X1 PAIN OF RIGHT SCAPULA: Primary | ICD-10-CM

## 2024-01-03 PROCEDURE — 99213 OFFICE O/P EST LOW 20 MIN: CPT | Performed by: STUDENT IN AN ORGANIZED HEALTH CARE EDUCATION/TRAINING PROGRAM

## 2024-01-03 NOTE — PATIENT INSTRUCTIONS
- Nerve test/ EMG ordered of both arms  - MRI of the right shoulder ordered  - We will call you once testing in done to discuss next steps  - OK to start reducing gabapentin dose to 100 mg nightly for a week then stop and see if your symptoms change  - Call if there are any questions

## 2024-01-03 NOTE — PROGRESS NOTES
Lesia Cast MD  Physical Medicine & Rehabilitation  [address] [address]  [phone]    1/3/24    Chief Complaint   Patient presents with    Back Pain     Back pain bruce.  Pain is better.  C/o aching, throbbing pain.  Sleeps better but still wakes up.  C/o numbness in left hand which is a new sx. Which started a week ago.  PT helped at first.  Gabapentin helping with nerve -.  Taking 200 mg. Nightly. Current pain level is a 6/10       HPI:  Jeane Miranda is a 63 y.o. year old woman seen today in follow up regarding right scapular pain.     Interval history: Since the last visit the patient reports that her tenderness is improved in the right scapula, nerve pain along the armpit is improved/resolved  Today, the pain is rated Pain Score:   6 where 0 is no pain and 10 is pain as bad as it can be. The pain is located in the medial right scapula, does not radiate to the axilla, and is described as nagging and aching.  This pain occurs all day. The symptoms have been better since onset.  Symptoms are exacerbated by raising arm overhead, sitting for prolonged periods. Factors which relieve the pain include standing and home exercises. Otherwise, the pain assessment has changed since the last visit.     She stopped taking the Aleve and takes gabapentin only at this time.    She reports that she has been having left hand numbness, worse at night. Numbness is present in the entire hand and wrist, worse at night time. No weakness reported in the left hand.     She does report one episode of dropping a cup of coffee.    She is worried about the pain persisting and that it recurred from her episode a few years ago years ago, but encouraged that the pain has improved some.  Did feel like there was some increased pain and swelling after a PT session, so stopped PT until follow up.    History reviewed. No pertinent past medical history.    Current Outpatient Medications   Medication Sig Dispense

## 2024-02-02 NOTE — PROGRESS NOTES
[facility]  Phone: [phone] Fax: [phone]       Physical Therapy Daily Treatment Note  Date:  2023    Patient Name:  Pamela Johnson    :  1960  MRN: [id number]    Restrictions/Precautions:    Diagnosis:  Right Shoulder/Scapular Pain   Treatment Diagnosis:    Insurance/Certification information:  BC/BS   Referring Physician:  Dr Otoniel Mckeon of care signed (Y/N):    Visit# / total visits:  3  Pain level: /10  Time In:  0710     Time Out: 0800      Subjective:      Exercises:  Exercise/Equipment Resistance/Repetitions Other comments     UBE  8 min             Seated Flex with Ball   10 reps       Wall leandra self str 10 reps                                                                                                                   Other Therapeutic Activities:      Home Exercise Program:      Manual Treatments:      Modalities:  Estim Right Shoulder/scapular region 20 min     Timed Code Treatment Minutes:       Total Treatment Minutes:      Treatment/Activity Tolerance:  [x] Patient tolerated treatment well [] Patient limited by fatigue  [] Patient limited by pain  [] Patient limited by other medical complications  [] Other:     Plan:   [x] Continue per plan of care [] Alter current plan (see comments)  [] Plan of care initiated [] Hold pending MD visit [] Discharge  Plan for Next Session:         Treatment Charges: Mins Units   Initial Evaluation     Re-Evaluation     Ther Exercise         TE 20 1   Manual Therapy     MT     Ther Activities        TA     Gait Training          GT     Neuro Re-education NR     Modalities 20 1   Non-Billable Service Time 10    Other     Total Time/Units 50 2     Electronically signed by:  Fabien Hatch, [address] [facility] n/a

## 2024-04-29 SDOH — ECONOMIC STABILITY: FOOD INSECURITY: WITHIN THE PAST 12 MONTHS, YOU WORRIED THAT YOUR FOOD WOULD RUN OUT BEFORE YOU GOT MONEY TO BUY MORE.: NEVER TRUE

## 2024-04-29 SDOH — ECONOMIC STABILITY: HOUSING INSECURITY
IN THE LAST 12 MONTHS, WAS THERE A TIME WHEN YOU DID NOT HAVE A STEADY PLACE TO SLEEP OR SLEPT IN A SHELTER (INCLUDING NOW)?: NO

## 2024-04-29 SDOH — ECONOMIC STABILITY: INCOME INSECURITY: HOW HARD IS IT FOR YOU TO PAY FOR THE VERY BASICS LIKE FOOD, HOUSING, MEDICAL CARE, AND HEATING?: NOT HARD AT ALL

## 2024-04-29 SDOH — ECONOMIC STABILITY: TRANSPORTATION INSECURITY
IN THE PAST 12 MONTHS, HAS LACK OF TRANSPORTATION KEPT YOU FROM MEETINGS, WORK, OR FROM GETTING THINGS NEEDED FOR DAILY LIVING?: NO

## 2024-04-29 SDOH — ECONOMIC STABILITY: FOOD INSECURITY: WITHIN THE PAST 12 MONTHS, THE FOOD YOU BOUGHT JUST DIDN'T LAST AND YOU DIDN'T HAVE MONEY TO GET MORE.: NEVER TRUE

## 2024-04-29 ASSESSMENT — PATIENT HEALTH QUESTIONNAIRE - PHQ9
SUM OF ALL RESPONSES TO PHQ QUESTIONS 1-9: 0
SUM OF ALL RESPONSES TO PHQ9 QUESTIONS 1 & 2: 0
1. LITTLE INTEREST OR PLEASURE IN DOING THINGS: NOT AT ALL
SUM OF ALL RESPONSES TO PHQ9 QUESTIONS 1 & 2: 0
2. FEELING DOWN, DEPRESSED OR HOPELESS: NOT AT ALL
1. LITTLE INTEREST OR PLEASURE IN DOING THINGS: NOT AT ALL
SUM OF ALL RESPONSES TO PHQ QUESTIONS 1-9: 0
2. FEELING DOWN, DEPRESSED OR HOPELESS: NOT AT ALL

## 2024-05-02 ENCOUNTER — OFFICE VISIT (OUTPATIENT)
Dept: PRIMARY CARE CLINIC | Age: 64
End: 2024-05-02
Payer: COMMERCIAL

## 2024-05-02 VITALS
WEIGHT: 138 LBS | BODY MASS INDEX: 20.92 KG/M2 | TEMPERATURE: 98 F | HEIGHT: 68 IN | DIASTOLIC BLOOD PRESSURE: 88 MMHG | OXYGEN SATURATION: 98 % | HEART RATE: 63 BPM | SYSTOLIC BLOOD PRESSURE: 120 MMHG

## 2024-05-02 DIAGNOSIS — M15.9 PRIMARY OSTEOARTHRITIS INVOLVING MULTIPLE JOINTS: Primary | ICD-10-CM

## 2024-05-02 PROBLEM — M15.0 PRIMARY OSTEOARTHRITIS INVOLVING MULTIPLE JOINTS: Status: ACTIVE | Noted: 2024-05-02

## 2024-05-02 PROCEDURE — 99213 OFFICE O/P EST LOW 20 MIN: CPT | Performed by: FAMILY MEDICINE

## 2024-05-02 RX ORDER — ORPHENADRINE CITRATE 100 MG/1
100 TABLET, EXTENDED RELEASE ORAL 2 TIMES DAILY
Qty: 60 TABLET | Refills: 5 | Status: SHIPPED | OUTPATIENT
Start: 2024-05-02

## 2024-05-02 RX ORDER — METHYLPREDNISOLONE 4 MG/1
TABLET ORAL
Qty: 1 KIT | Refills: 0 | Status: SHIPPED | OUTPATIENT
Start: 2024-05-02

## 2024-05-02 ASSESSMENT — ENCOUNTER SYMPTOMS
RHINORRHEA: 0
BACK PAIN: 1
COUGH: 0
VOMITING: 0
PHOTOPHOBIA: 0
DIARRHEA: 0
WHEEZING: 0
SORE THROAT: 0
CONSTIPATION: 0
NAUSEA: 0
BLOOD IN STOOL: 0
ABDOMINAL PAIN: 0
SHORTNESS OF BREATH: 0
EYE REDNESS: 0

## 2024-05-02 NOTE — PROGRESS NOTES
Jeane Miranda (:  1960) is a 63 y.o. female,New patient, here for evaluation of the following chief complaint(s):  Hip Pain (Bilateral hip pain started 3-4 months ago.  Pain is described as aching in nature.  Mostly constant.  Patient stands constantly and rarely sits. ) and Knee Pain (Bilateral knee started 3-4 months ago as well. )      Assessment & Plan   ASSESSMENT/PLAN:  1. Primary osteoarthritis involving multiple joints  At this time we will treat symptomatically.  Left upper extremity numbness has resolved.  Did not get MRI/EMG because of this.  Will continue with current exercise regimen and notify us if she experiences any worsening of pain or numbness/tingling to the lower extremities for possible lumbar MRI/imaging.    Return in about 6 months (around 2024).         Subjective   SUBJECTIVE/OBJECTIVE:  HPI  Patient presents today to establish with new PCP.  Previous PCP recently retired from our office.  Patient's main concern at this time is worsening right-sided shoulder and thoracic back pain.  Patient states that this has occurred in the past.  Review of chart shows imaging from  showing complete right shoulder tendinopathy without tear.  Patient states that the pain is in the similar location however at this time she does have numbness, tingling, and burning.  Patient denies ever having shingles in this area.  Denies any trauma or injury prior to symptoms beginning.  Review of recent CT highly suggestive of arthritis and degenerative disc disease.  Patient states that the symptoms are all currently on the right side.  No real radiation of pain or dysfunction.  No other systemic issues.  No family history of autoimmune arthropathy.    Update 2024  Patient presents today for follow-up on chronic issues.  Continues to have her right shoulder issue.  Has seen physical medicine and rehabilitation.  Participated in physical therapy without much change in symptoms.  Did develop some

## 2024-09-06 ENCOUNTER — TELEPHONE (OUTPATIENT)
Dept: PRIMARY CARE CLINIC | Age: 64
End: 2024-09-06

## 2024-09-06 RX ORDER — PREDNISONE 10 MG/1
TABLET ORAL
Qty: 30 TABLET | Refills: 0 | Status: SHIPPED | OUTPATIENT
Start: 2024-09-06

## 2024-09-06 RX ORDER — AMOXICILLIN 875 MG
875 TABLET ORAL 2 TIMES DAILY
Qty: 14 TABLET | Refills: 0 | Status: SHIPPED | OUTPATIENT
Start: 2024-09-06 | End: 2024-09-13

## 2024-09-06 NOTE — TELEPHONE ENCOUNTER
The pt is calling because she has a cough, sore throat, and is feeling really exhausted, she is asking if something can be sent to the [facility] in [place] because she is out of town

## 2024-09-19 ENCOUNTER — OFFICE VISIT (OUTPATIENT)
Dept: PRIMARY CARE CLINIC | Age: 64
End: 2024-09-19
Payer: COMMERCIAL

## 2024-09-19 ENCOUNTER — HOSPITAL ENCOUNTER (OUTPATIENT)
Age: 64
Discharge: HOME | End: 2024-09-19
Payer: COMMERCIAL

## 2024-09-19 VITALS
HEART RATE: 80 BPM | TEMPERATURE: 97.6 F | HEIGHT: 68 IN | DIASTOLIC BLOOD PRESSURE: 74 MMHG | OXYGEN SATURATION: 98 % | BODY MASS INDEX: 20.31 KG/M2 | SYSTOLIC BLOOD PRESSURE: 124 MMHG | WEIGHT: 134 LBS

## 2024-09-19 DIAGNOSIS — L30.9 DERMATITIS, UNSPECIFIED: Primary | ICD-10-CM

## 2024-09-19 DIAGNOSIS — L30.9 DERMATITIS, UNSPECIFIED: ICD-10-CM

## 2024-09-19 LAB
ALBUMIN SERPL-MCNC: 4.6 G/DL (ref 3.5–5.2)
ALP SERPL-CCNC: 79 U/L (ref 35–104)
ALT SERPL-CCNC: 14 U/L (ref 0–32)
ANION GAP SERPL CALCULATED.3IONS-SCNC: 10 MMOL/L (ref 7–16)
AST SERPL-CCNC: 17 U/L (ref 0–31)
BASOPHILS # BLD: 0.08 K/UL (ref 0–0.2)
BASOPHILS NFR BLD: 2 % (ref 0–2)
BILIRUB DIRECT SERPL-MCNC: <0.2 MG/DL (ref 0–0.3)
BILIRUB INDIRECT SERPL-MCNC: ABNORMAL MG/DL (ref 0–1)
BILIRUB SERPL-MCNC: 0.3 MG/DL (ref 0–1.2)
BUN SERPL-MCNC: 25 MG/DL (ref 6–23)
CALCIUM SERPL-MCNC: 9.8 MG/DL (ref 8.6–10.2)
CHLORIDE SERPL-SCNC: 100 MMOL/L (ref 98–107)
CO2 SERPL-SCNC: 29 MMOL/L (ref 22–29)
CREAT SERPL-MCNC: 0.8 MG/DL (ref 0.5–1)
CRP SERPL HS-MCNC: 3 MG/L (ref 0–5)
D-DIMER QUANTITATIVE: <200 NG/ML DDU (ref 0–230)
EOSINOPHIL # BLD: 0.51 K/UL (ref 0.05–0.5)
EOSINOPHILS RELATIVE PERCENT: 10 % (ref 0–6)
ERYTHROCYTE [DISTWIDTH] IN BLOOD BY AUTOMATED COUNT: 13.6 % (ref 11.5–15)
FIBRINOGEN PPP-MCNC: 315 MG/DL (ref 200–400)
GFR, ESTIMATED: 83 ML/MIN/1.73M2
GLUCOSE SERPL-MCNC: 90 MG/DL (ref 74–99)
HCT VFR BLD AUTO: 44.9 % (ref 34–48)
HGB BLD-MCNC: 14.6 G/DL (ref 11.5–15.5)
IMM GRANULOCYTES # BLD AUTO: <0.03 K/UL (ref 0–0.58)
IMM GRANULOCYTES NFR BLD: 0 % (ref 0–5)
LYMPHOCYTES NFR BLD: 1.88 K/UL (ref 1.5–4)
LYMPHOCYTES RELATIVE PERCENT: 37 % (ref 20–42)
MCH RBC QN AUTO: 31.2 PG (ref 26–35)
MCHC RBC AUTO-ENTMCNC: 32.5 G/DL (ref 32–34.5)
MCV RBC AUTO: 95.9 FL (ref 80–99.9)
MONOCYTES NFR BLD: 0.29 K/UL (ref 0.1–0.95)
MONOCYTES NFR BLD: 6 % (ref 2–12)
NEUTROPHILS NFR BLD: 46 % (ref 43–80)
NEUTS SEG NFR BLD: 2.33 K/UL (ref 1.8–7.3)
PLATELET # BLD AUTO: 330 K/UL (ref 130–450)
PMV BLD AUTO: 9.9 FL (ref 7–12)
POTASSIUM SERPL-SCNC: 4.1 MMOL/L (ref 3.5–5)
PROT SERPL-MCNC: 7.2 G/DL (ref 6.4–8.3)
RBC # BLD AUTO: 4.68 M/UL (ref 3.5–5.5)
SODIUM SERPL-SCNC: 139 MMOL/L (ref 132–146)
WBC OTHER # BLD: 5.1 K/UL (ref 4.5–11.5)

## 2024-09-19 PROCEDURE — 86618 LYME DISEASE ANTIBODY: CPT

## 2024-09-19 PROCEDURE — 85025 COMPLETE CBC W/AUTO DIFF WBC: CPT

## 2024-09-19 PROCEDURE — 99213 OFFICE O/P EST LOW 20 MIN: CPT | Performed by: FAMILY MEDICINE

## 2024-09-19 PROCEDURE — 80053 COMPREHEN METABOLIC PANEL: CPT

## 2024-09-19 PROCEDURE — 96372 THER/PROPH/DIAG INJ SC/IM: CPT | Performed by: FAMILY MEDICINE

## 2024-09-19 PROCEDURE — 86140 C-REACTIVE PROTEIN: CPT

## 2024-09-19 PROCEDURE — 85379 FIBRIN DEGRADATION QUANT: CPT

## 2024-09-19 PROCEDURE — 82595 ASSAY OF CRYOGLOBULIN: CPT

## 2024-09-19 PROCEDURE — 85384 FIBRINOGEN ACTIVITY: CPT

## 2024-09-19 PROCEDURE — 82248 BILIRUBIN DIRECT: CPT

## 2024-09-19 PROCEDURE — 1123F ACP DISCUSS/DSCN MKR DOCD: CPT | Performed by: FAMILY MEDICINE

## 2024-09-19 PROCEDURE — 86698 HISTOPLASMA ANTIBODY: CPT

## 2024-09-19 PROCEDURE — 86738 MYCOPLASMA ANTIBODY: CPT

## 2024-09-19 RX ORDER — HYDROXYZINE PAMOATE 25 MG/1
25 CAPSULE ORAL 3 TIMES DAILY PRN
Qty: 90 CAPSULE | Refills: 3 | Status: SHIPPED | OUTPATIENT
Start: 2024-09-19

## 2024-09-19 RX ORDER — DEXAMETHASONE SODIUM PHOSPHATE 10 MG/ML
10 INJECTION, EMULSION INTRAMUSCULAR; INTRAVENOUS ONCE
Status: COMPLETED | OUTPATIENT
Start: 2024-09-19 | End: 2024-09-19

## 2024-09-19 RX ORDER — PREDNISONE 10 MG/1
TABLET ORAL
Qty: 30 TABLET | Refills: 0 | Status: SHIPPED | OUTPATIENT
Start: 2024-09-19

## 2024-09-19 RX ORDER — TRIAMCINOLONE ACETONIDE 1 MG/G
OINTMENT TOPICAL 2 TIMES DAILY
Qty: 80 G | Refills: 1 | Status: SHIPPED | OUTPATIENT
Start: 2024-09-19

## 2024-09-19 RX ADMIN — DEXAMETHASONE SODIUM PHOSPHATE 10 MG: 10 INJECTION, EMULSION INTRAMUSCULAR; INTRAVENOUS at 12:11

## 2024-09-19 ASSESSMENT — ENCOUNTER SYMPTOMS
PHOTOPHOBIA: 0
BLOOD IN STOOL: 0
CONSTIPATION: 0
BACK PAIN: 0
SHORTNESS OF BREATH: 0
SORE THROAT: 0
COLOR CHANGE: 1
NAUSEA: 0
COUGH: 0
ABDOMINAL PAIN: 0
VOMITING: 0

## 2024-09-20 ENCOUNTER — TELEPHONE (OUTPATIENT)
Dept: PRIMARY CARE CLINIC | Age: 64
End: 2024-09-20

## 2024-09-20 RX ORDER — DOXYCYCLINE 100 MG/1
100 TABLET ORAL 2 TIMES DAILY
Qty: 20 TABLET | Refills: 0 | Status: SHIPPED | OUTPATIENT
Start: 2024-09-20 | End: 2024-09-30

## 2024-09-23 LAB
B BURGDOR AB SER IA-ACNC: 0.15 IV
CRYOGLOB SER-MCNC: NEGATIVE MG/DL

## 2024-09-24 LAB
M PNEUMO IGG SER IA-ACNC: 0.12 U/L
M PNEUMO IGM SER IA-ACNC: 0 U/L

## 2024-09-25 LAB — H CAPSUL AB SER QL ID: NOT DETECTED

## 2024-10-07 LAB — MAMMOGRAPHY, EXTERNAL: NORMAL

## 2024-11-05 ENCOUNTER — OFFICE VISIT (OUTPATIENT)
Dept: PRIMARY CARE CLINIC | Age: 64
End: 2024-11-05

## 2024-11-05 VITALS
WEIGHT: 142 LBS | HEIGHT: 68 IN | DIASTOLIC BLOOD PRESSURE: 74 MMHG | BODY MASS INDEX: 21.52 KG/M2 | OXYGEN SATURATION: 98 % | SYSTOLIC BLOOD PRESSURE: 128 MMHG | HEART RATE: 74 BPM | TEMPERATURE: 97.2 F

## 2024-11-05 DIAGNOSIS — E78.2 MIXED HYPERLIPIDEMIA: ICD-10-CM

## 2024-11-05 DIAGNOSIS — R73.03 PREDIABETES: ICD-10-CM

## 2024-11-05 DIAGNOSIS — N39.0 RECURRENT UTI: Primary | ICD-10-CM

## 2024-11-05 RX ORDER — CEFDINIR 300 MG/1
300 CAPSULE ORAL 2 TIMES DAILY
Qty: 14 CAPSULE | Refills: 0 | Status: SHIPPED | OUTPATIENT
Start: 2024-11-05 | End: 2024-11-12

## 2024-11-05 ASSESSMENT — ENCOUNTER SYMPTOMS
SORE THROAT: 0
CONSTIPATION: 0
PHOTOPHOBIA: 0
COUGH: 0
VOMITING: 0
ABDOMINAL PAIN: 0
BACK PAIN: 0
NAUSEA: 0
BLOOD IN STOOL: 0
DIARRHEA: 0
SHORTNESS OF BREATH: 0

## 2024-11-05 NOTE — PROGRESS NOTES
vaccine  Aged Out    Hib vaccine  Aged Out    Polio vaccine  Aged Out    Meningococcal (ACWY) vaccine  Aged Out    Diabetes screen  Discontinued    Hepatitis C screen  Discontinued    HIV screen  Discontinued      There are no preventive care reminders to display for this patient.   There are no preventive care reminders to display for this patient.     /74   Pulse 74   Temp 97.2 °F (36.2 °C)   Ht 1.727 m (5' 8\")   Wt 64.4 kg (142 lb)   SpO2 98%   BMI 21.59 kg/m²    Objective   Physical Exam  Vitals reviewed.   Constitutional:       Appearance: She is well-developed.   HENT:      Head: Normocephalic and atraumatic.   Eyes:      Conjunctiva/sclera: Conjunctivae normal.      Pupils: Pupils are equal, round, and reactive to light.   Cardiovascular:      Rate and Rhythm: Normal rate and regular rhythm.      Heart sounds: Normal heart sounds.   Pulmonary:      Effort: Pulmonary effort is normal.      Breath sounds: Normal breath sounds.   Abdominal:      General: Bowel sounds are normal.      Palpations: Abdomen is soft.      Tenderness: There is no abdominal tenderness.   Skin:     General: Skin is warm and dry.   Neurological:      Mental Status: She is alert and oriented to person, place, and time.   Psychiatric:         Behavior: Behavior normal.         Judgment: Judgment normal.                  An electronic signature was used to authenticate this note.    --Joe Hernandez, DO

## 2024-11-05 NOTE — ASSESSMENT & PLAN NOTE
Orders:    Comprehensive Metabolic Panel with Bilirubin; Future    Hemoglobin A1C; Future    Lipid Panel; Future    Urinalysis with Microscopic; Future    Culture, Urine; Future    TSH; Future

## 2024-11-20 DIAGNOSIS — E78.2 MIXED HYPERLIPIDEMIA: ICD-10-CM

## 2024-11-20 DIAGNOSIS — N39.0 RECURRENT UTI: ICD-10-CM

## 2024-11-20 DIAGNOSIS — R73.03 PREDIABETES: ICD-10-CM

## 2024-11-20 LAB
ALBUMIN: 4.2 G/DL (ref 3.5–5.2)
ALP BLD-CCNC: 71 U/L (ref 35–104)
ALT SERPL-CCNC: 10 U/L (ref 0–32)
ANION GAP SERPL CALCULATED.3IONS-SCNC: 13 MMOL/L (ref 7–16)
AST SERPL-CCNC: 21 U/L (ref 0–31)
BACTERIA: ABNORMAL
BILIRUB SERPL-MCNC: 0.2 MG/DL (ref 0–1.2)
BILIRUBIN DIRECT: <0.2 MG/DL (ref 0–0.3)
BILIRUBIN, INDIRECT: NORMAL MG/DL (ref 0–1)
BILIRUBIN, URINE: NEGATIVE
BUN BLDV-MCNC: 21 MG/DL (ref 6–23)
CALCIUM SERPL-MCNC: 9.2 MG/DL (ref 8.6–10.2)
CHLORIDE BLD-SCNC: 107 MMOL/L (ref 98–107)
CHOLESTEROL, TOTAL: 216 MG/DL
CO2: 23 MMOL/L (ref 22–29)
COLOR, UA: YELLOW
CREAT SERPL-MCNC: 0.7 MG/DL (ref 0.5–1)
EPITHELIAL CELLS, UA: ABNORMAL /HPF
GFR, ESTIMATED: 90 ML/MIN/1.73M2
GLUCOSE BLD-MCNC: 95 MG/DL (ref 74–99)
GLUCOSE URINE: NEGATIVE MG/DL
HBA1C MFR BLD: 6.1 % (ref 4–5.6)
HDLC SERPL-MCNC: 87 MG/DL
KETONES, URINE: NEGATIVE MG/DL
LDL CHOLESTEROL: 118 MG/DL
LEUKOCYTE ESTERASE, URINE: NEGATIVE
NITRITE, URINE: NEGATIVE
PH, URINE: 6 (ref 5–9)
POTASSIUM SERPL-SCNC: 4.5 MMOL/L (ref 3.5–5)
PROTEIN UA: NEGATIVE MG/DL
RBC UA: ABNORMAL /HPF
SODIUM BLD-SCNC: 143 MMOL/L (ref 132–146)
SPECIFIC GRAVITY UA: 1.02 (ref 1–1.03)
TOTAL PROTEIN: 6.7 G/DL (ref 6.4–8.3)
TRIGL SERPL-MCNC: 55 MG/DL
TSH SERPL DL<=0.05 MIU/L-ACNC: 2.98 UIU/ML (ref 0.27–4.2)
TURBIDITY: CLEAR
URINE HGB: NEGATIVE
UROBILINOGEN, URINE: 0.2 EU/DL (ref 0–1)
VLDLC SERPL CALC-MCNC: 11 MG/DL
WBC UA: ABNORMAL /HPF

## 2024-11-22 LAB
CULTURE: ABNORMAL
SPECIMEN DESCRIPTION: ABNORMAL

## 2025-01-15 DIAGNOSIS — M15.0 PRIMARY OSTEOARTHRITIS INVOLVING MULTIPLE JOINTS: Primary | ICD-10-CM

## 2025-03-28 ENCOUNTER — OFFICE VISIT (OUTPATIENT)
Dept: FAMILY MEDICINE CLINIC | Age: 65
End: 2025-03-28
Payer: COMMERCIAL

## 2025-03-28 VITALS
BODY MASS INDEX: 20.98 KG/M2 | TEMPERATURE: 97.4 F | WEIGHT: 138 LBS | SYSTOLIC BLOOD PRESSURE: 120 MMHG | OXYGEN SATURATION: 98 % | RESPIRATION RATE: 16 BRPM | DIASTOLIC BLOOD PRESSURE: 70 MMHG | HEART RATE: 63 BPM

## 2025-03-28 DIAGNOSIS — M54.12 CERVICAL RADICULOPATHY: ICD-10-CM

## 2025-03-28 DIAGNOSIS — E78.2 MIXED HYPERLIPIDEMIA: ICD-10-CM

## 2025-03-28 DIAGNOSIS — M54.12 CERVICAL RADICULOPATHY: Primary | ICD-10-CM

## 2025-03-28 LAB
ALBUMIN: 4.4 G/DL (ref 3.5–5.2)
ALP BLD-CCNC: 68 U/L (ref 35–104)
ALT SERPL-CCNC: 11 U/L (ref 0–32)
ANION GAP SERPL CALCULATED.3IONS-SCNC: 20 MMOL/L (ref 7–16)
AST SERPL-CCNC: 23 U/L (ref 0–31)
BILIRUB SERPL-MCNC: 0.3 MG/DL (ref 0–1.2)
BILIRUBIN DIRECT: <0.2 MG/DL (ref 0–0.3)
BILIRUBIN, INDIRECT: ABNORMAL MG/DL (ref 0–1)
BUN BLDV-MCNC: 16 MG/DL (ref 6–23)
CALCIUM SERPL-MCNC: 9.8 MG/DL (ref 8.6–10.2)
CHLORIDE BLD-SCNC: 105 MMOL/L (ref 98–107)
CHOLESTEROL, TOTAL: 197 MG/DL
CO2: 18 MMOL/L (ref 22–29)
CREAT SERPL-MCNC: 0.7 MG/DL (ref 0.5–1)
GFR, ESTIMATED: >90 ML/MIN/1.73M2
GLUCOSE BLD-MCNC: 90 MG/DL (ref 74–99)
HDLC SERPL-MCNC: 82 MG/DL
LDL CHOLESTEROL: 104 MG/DL
POTASSIUM SERPL-SCNC: 4.7 MMOL/L (ref 3.5–5)
SODIUM BLD-SCNC: 143 MMOL/L (ref 132–146)
TOTAL PROTEIN: 6.8 G/DL (ref 6.4–8.3)
TRIGL SERPL-MCNC: 53 MG/DL
TSH SERPL DL<=0.05 MIU/L-ACNC: 2.03 UIU/ML (ref 0.27–4.2)
VLDLC SERPL CALC-MCNC: 11 MG/DL

## 2025-03-28 PROCEDURE — 99213 OFFICE O/P EST LOW 20 MIN: CPT | Performed by: FAMILY MEDICINE

## 2025-03-28 PROCEDURE — 1123F ACP DISCUSS/DSCN MKR DOCD: CPT | Performed by: FAMILY MEDICINE

## 2025-03-28 RX ORDER — ACETAMINOPHEN 160 MG
2000 TABLET,DISINTEGRATING ORAL DAILY
COMMUNITY

## 2025-03-28 ASSESSMENT — ENCOUNTER SYMPTOMS
ABDOMINAL PAIN: 0
SORE THROAT: 0
BACK PAIN: 0
PHOTOPHOBIA: 0
BLOOD IN STOOL: 0
CONSTIPATION: 0
VOMITING: 0
DIARRHEA: 0
SHORTNESS OF BREATH: 0
COUGH: 0
NAUSEA: 0

## 2025-03-28 NOTE — PROGRESS NOTES
Jeane Miranda (:  1960) is a 64 y.o. female,Established patient, here for evaluation of the following chief complaint(s):  Hand Problem (Numbness left hand x 6 weeks/Happens at night)         Assessment & Plan  Cervical radiculopathy  At this time we will have her adjust her pillows and do some stretching exercises.  No improvement in the next 1 to 2 weeks will need further evaluation including possibly physical therapy and MRI to rule out disc herniation.    Orders:    Hemoglobin A1C; Future    Lipid Panel; Future    Comprehensive Metabolic Panel with Bilirubin; Future    TSH; Future    Mixed hyperlipidemia  Repeat labs today.    Orders:    Hemoglobin A1C; Future    Lipid Panel; Future    Comprehensive Metabolic Panel with Bilirubin; Future    TSH; Future      No follow-ups on file.       Subjective   Hand Problem  Associated symptoms include neck pain, numbness and weakness. Pertinent negatives include no abdominal pain, chest pain, chills, congestion, coughing, fever, headaches, myalgias, nausea, sore throat or vomiting.     Patient presents today for 6-week history of intermittent left lower arm numbness.  Patient denies any trauma or injury prior to symptoms beginning.  Patient states that the symptoms appear to be worse at night.  They do resolve by morning and do not bother her during the day.  Symptoms appear to be from the elbow distally.  Does have a history of cervical degenerative changes.  No recent imaging.    Review of Systems   Constitutional:  Negative for chills and fever.   HENT:  Negative for congestion, hearing loss, nosebleeds and sore throat.    Eyes:  Negative for photophobia.   Respiratory:  Negative for cough and shortness of breath.    Cardiovascular:  Negative for chest pain, palpitations and leg swelling.   Gastrointestinal:  Negative for abdominal pain, blood in stool, constipation, diarrhea, nausea and vomiting.   Endocrine: Negative for polydipsia.   Genitourinary:

## 2025-03-29 LAB — HBA1C MFR BLD: 5.7 % (ref 4–5.6)

## 2025-03-31 ENCOUNTER — RESULTS FOLLOW-UP (OUTPATIENT)
Dept: FAMILY MEDICINE CLINIC | Age: 65
End: 2025-03-31